# Patient Record
Sex: FEMALE | Employment: STUDENT | URBAN - METROPOLITAN AREA
[De-identification: names, ages, dates, MRNs, and addresses within clinical notes are randomized per-mention and may not be internally consistent; named-entity substitution may affect disease eponyms.]

---
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
## 2024-01-21 DIAGNOSIS — M53.3 SI (SACROILIAC) JOINT DYSFUNCTION: ICD-10-CM

## 2024-01-21 RX ORDER — DICLOFENAC SODIUM 75 MG/1
75 TABLET, DELAYED RELEASE ORAL 2 TIMES DAILY PRN
Qty: 40 TABLET | Refills: 1 | Status: SHIPPED | OUTPATIENT
Start: 2024-01-21

## 2024-01-25 ENCOUNTER — OFFICE VISIT (OUTPATIENT)
Dept: FAMILY MEDICINE | Facility: CLINIC | Age: 19
End: 2024-01-25
Payer: COMMERCIAL

## 2024-01-25 VITALS
HEIGHT: 66 IN | DIASTOLIC BLOOD PRESSURE: 45 MMHG | SYSTOLIC BLOOD PRESSURE: 106 MMHG | WEIGHT: 151 LBS | HEART RATE: 70 BPM | BODY MASS INDEX: 24.27 KG/M2

## 2024-01-25 DIAGNOSIS — M25.532 FOREARM JOINT PAIN, LEFT: Primary | ICD-10-CM

## 2024-01-25 ASSESSMENT — ENCOUNTER SYMPTOMS
PSYCHIATRIC NEGATIVE: 1
MYALGIAS: 1
NEUROLOGICAL NEGATIVE: 1
CONSTITUTIONAL NEGATIVE: 1

## 2024-01-25 NOTE — LETTER
1/25/2024      RE: Maida Thompson  [address]  [address]  [facility] [address]     Dear Colleague,    Thank you for referring your patient, Maida Thompson, to the [facility]. Please see a copy of my visit note below.    HPI  IN for f/u of L forearm pain.  Gets swelling and tightness after rowing.  Getting worse.  Some numbness and tingling at times.  Some blanching of the skin.  Has a very tight watch on her wrist which indents the skin.      Review of Systems   Constitutional: Negative.    HENT: Negative.    Musculoskeletal: Positive for joint pain and myalgias.   Neurological: Negative.    Psychiatric/Behavioral: Negative.          Physical Exam  Vitals reviewed.   Constitutional:       Appearance: Normal appearance.   Musculoskeletal:         General: No swelling, tenderness, deformity or signs of injury. Normal range of motion.   Skin:     General: Skin is warm and dry.      Capillary Refill: Capillary refill takes less than 2 seconds.      Findings: No bruising, erythema, lesion or rash.   Neurological:      General: No focal deficit present.      Mental Status: She is alert and oriented to person, place, and time.      Cranial Nerves: No cranial nerve deficit.      Sensory: No sensory deficit.      Motor: No weakness.      Coordination: Coordination normal.      Deep Tendon Reflexes: Reflexes normal.   Psychiatric:         Mood and Affect: Mood normal.         Thought Content: Thought content normal.         Judgment: Judgment normal.         L forearm pain  -concerns for tendinopathy vs compartment syndrome  -does wear a very tight watch on that wrist as well  -will get Xray/MRI and then likely test compartment pressures  -she will remove watch on that wrist to see if that helps symptoms as well.      Again, thank you for allowing me to participate in the care of your patient.      Sincerely,    Justin Voss MD

## 2024-01-25 NOTE — PROGRESS NOTES
HPI  IN for f/u of L forearm pain.  Gets swelling and tightness after rowing.  Getting worse.  Some numbness and tingling at times.  Some blanching of the skin.  Has a very tight watch on her wrist which indents the skin.      Review of Systems   Constitutional: Negative.    HENT: Negative.    Musculoskeletal: Positive for joint pain and myalgias.   Neurological: Negative.    Psychiatric/Behavioral: Negative.          Physical Exam  Vitals reviewed.   Constitutional:       Appearance: Normal appearance.   Musculoskeletal:         General: No swelling, tenderness, deformity or signs of injury. Normal range of motion.   Skin:     General: Skin is warm and dry.      Capillary Refill: Capillary refill takes less than 2 seconds.      Findings: No bruising, erythema, lesion or rash.   Neurological:      General: No focal deficit present.      Mental Status: She is alert and oriented to person, place, and time.      Cranial Nerves: No cranial nerve deficit.      Sensory: No sensory deficit.      Motor: No weakness.      Coordination: Coordination normal.      Deep Tendon Reflexes: Reflexes normal.   Psychiatric:         Mood and Affect: Mood normal.         Thought Content: Thought content normal.         Judgment: Judgment normal.         L forearm pain  -concerns for tendinopathy vs compartment syndrome  -does wear a very tight watch on that wrist as well  -will get Xray/MRI and then likely test compartment pressures  -she will remove watch on that wrist to see if that helps symptoms as well.

## 2024-01-26 ENCOUNTER — ANCILLARY PROCEDURE (OUTPATIENT)
Dept: GENERAL RADIOLOGY | Facility: CLINIC | Age: 19
End: 2024-01-26
Attending: FAMILY MEDICINE
Payer: COMMERCIAL

## 2024-01-26 ENCOUNTER — ANCILLARY PROCEDURE (OUTPATIENT)
Dept: MRI IMAGING | Facility: CLINIC | Age: 19
End: 2024-01-26
Attending: FAMILY MEDICINE
Payer: COMMERCIAL

## 2024-01-26 DIAGNOSIS — M25.532 FOREARM JOINT PAIN, LEFT: ICD-10-CM

## 2024-01-26 PROCEDURE — 73090 X-RAY EXAM OF FOREARM: CPT | Mod: LT | Performed by: RADIOLOGY

## 2024-01-26 PROCEDURE — 73218 MRI UPPER EXTREMITY W/O DYE: CPT | Mod: LT | Performed by: RADIOLOGY

## 2024-02-01 ENCOUNTER — OFFICE VISIT (OUTPATIENT)
Dept: FAMILY MEDICINE | Facility: CLINIC | Age: 19
End: 2024-02-01
Payer: COMMERCIAL

## 2024-02-01 VITALS
HEART RATE: 76 BPM | BODY MASS INDEX: 24.27 KG/M2 | HEIGHT: 66 IN | WEIGHT: 151 LBS | SYSTOLIC BLOOD PRESSURE: 126 MMHG | DIASTOLIC BLOOD PRESSURE: 71 MMHG

## 2024-02-01 DIAGNOSIS — M25.532 FOREARM JOINT PAIN, LEFT: Primary | ICD-10-CM

## 2024-02-01 ASSESSMENT — ENCOUNTER SYMPTOMS
MYALGIAS: 1
NEUROLOGICAL NEGATIVE: 1
PSYCHIATRIC NEGATIVE: 1
NECK PAIN: 0
BACK PAIN: 1
GASTROINTESTINAL NEGATIVE: 1
CONSTITUTIONAL NEGATIVE: 1
RESPIRATORY NEGATIVE: 1
FALLS: 0

## 2024-02-01 NOTE — PROGRESS NOTES
HPI  In for f/u of L arm pain and swelling.  Were going to do a comp pressure testing today but she hurt her back.  Arm is the same, but better with less activity.  Back was a problem last year.  On meds for that.  No other concerns.    Review of Systems   Constitutional: Negative.    HENT: Negative.    Respiratory: Negative.    Gastrointestinal: Negative.    Musculoskeletal: Positive for back pain and myalgias. Negative for falls, joint pain and neck pain.   Neurological: Negative.    Psychiatric/Behavioral: Negative.          Physical Exam  Vitals reviewed.   Musculoskeletal:         General: Tenderness present. Normal range of motion.   Skin:     General: Skin is warm and dry.   Neurological:      General: No focal deficit present.      Mental Status: She is alert and oriented to person, place, and time. Mental status is at baseline.      Cranial Nerves: No cranial nerve deficit.      Sensory: No sensory deficit.      Motor: No weakness.      Coordination: Coordination normal.      Gait: Gait normal.      Deep Tendon Reflexes: Reflexes normal.   Psychiatric:         Mood and Affect: Mood normal.         Behavior: Behavior normal.         Thought Content: Thought content normal.         Judgment: Judgment normal.           L arm pain/swelling  -back pain currently so will do compartment testing once she is able to exercise  -cont nsaids

## 2024-02-01 NOTE — LETTER
2/1/2024      RE: Maida Thompson  [address]  [address]  [facility] [address]     Dear Colleague,    Thank you for referring your patient, Maida Thompson, to the [facility]. Please see a copy of my visit note below.    HPI  In for f/u of L arm pain and swelling.  Were going to do a comp pressure testing today but she hurt her back.  Arm is the same, but better with less activity.  Back was a problem last year.  On meds for that.  No other concerns.    Review of Systems   Constitutional: Negative.    HENT: Negative.    Respiratory: Negative.    Gastrointestinal: Negative.    Musculoskeletal: Positive for back pain and myalgias. Negative for falls, joint pain and neck pain.   Neurological: Negative.    Psychiatric/Behavioral: Negative.          Physical Exam  Vitals reviewed.   Musculoskeletal:         General: Tenderness present. Normal range of motion.   Skin:     General: Skin is warm and dry.   Neurological:      General: No focal deficit present.      Mental Status: She is alert and oriented to person, place, and time. Mental status is at baseline.      Cranial Nerves: No cranial nerve deficit.      Sensory: No sensory deficit.      Motor: No weakness.      Coordination: Coordination normal.      Gait: Gait normal.      Deep Tendon Reflexes: Reflexes normal.   Psychiatric:         Mood and Affect: Mood normal.         Behavior: Behavior normal.         Thought Content: Thought content normal.         Judgment: Judgment normal.           L arm pain/swelling  -back pain currently so will do compartment testing once she is able to exercise  -cont nsaids       Again, thank you for allowing me to participate in the care of your patient.      Sincerely,    Justin Voss MD

## 2024-02-12 DIAGNOSIS — M79.89 PAIN AND SWELLING OF LEFT FOREARM: Primary | ICD-10-CM

## 2024-02-12 DIAGNOSIS — M79.632 PAIN AND SWELLING OF LEFT FOREARM: Primary | ICD-10-CM

## 2024-02-12 NOTE — PROGRESS NOTES
Ongoing left forearm pain and swelling especially with rowing. Was wearing a tight watch but has since removed the watch. Concern for exertional compartment pressure syndrome vs dvt. Will order US today, if negative will move forward with compartment pressure testing.     Discussed with attending Dr. Lonnie Voss MD

## 2024-02-16 ENCOUNTER — ANCILLARY PROCEDURE (OUTPATIENT)
Dept: ULTRASOUND IMAGING | Facility: CLINIC | Age: 19
End: 2024-02-16
Attending: FAMILY MEDICINE
Payer: COMMERCIAL

## 2024-02-16 DIAGNOSIS — M79.632 PAIN AND SWELLING OF LEFT FOREARM: ICD-10-CM

## 2024-02-16 DIAGNOSIS — M79.89 PAIN AND SWELLING OF LEFT FOREARM: ICD-10-CM

## 2024-02-16 PROCEDURE — 93971 EXTREMITY STUDY: CPT | Mod: LT | Performed by: RADIOLOGY

## 2024-03-12 ENCOUNTER — OFFICE VISIT (OUTPATIENT)
Dept: FAMILY MEDICINE | Facility: CLINIC | Age: 19
End: 2024-03-12
Payer: COMMERCIAL

## 2024-03-12 DIAGNOSIS — M25.532 FOREARM JOINT PAIN, LEFT: Primary | ICD-10-CM

## 2024-03-12 NOTE — LETTER
3/12/2024      RE: Maida Thompson  [address]  [address]  [facility] [address]     Dear Colleague,    Thank you for referring your patient, Maida Thompson, to the [facility]. Please see a copy of my visit note below.    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   She has had a full workup regarding left forearm pain and tightening during rowing which included MRI w/o contrast and US. Both were negative.   She explains that her left arm can go numbness when her elbow is fully extended for a short period of time. When she rows her hands become red and her forearms before white.   She notices a large bulge from the volar aspect of there left forearm when rowing. Symptoms resolve when she stopped rowing but can take hours.   Symptoms also occur when she is running and has her elbows flexed as 90 degrees.     COMPARTMENT PRESSURE TESTING.   The pt was verbally consented. Left upper extremity was sterilly prepped over deep volar and superficial volar comparments. Compartments were identified with ultrasound guidance and vasculature was noted in order to avoid it. Cpmpass unit was used and needle inserted into each of 2 compartments and resting pressures recorded. Pt was then instructed to row on erg until severe symptoms were reproduced and the procedure was repeated, again under sterile technique. Post-exertion pressures were recorded. Pt tolerated procedure well.     LEFT  PRE  POST (1min)  POST (5 mins)   Superficial Volar  10 8 4   Deep Volar 25  23 17     The patient exercised on a rowing machine for 18 minutes after a pre warm-up.   Developed pain in the LEFT Volar proximal and mid forearm approx 8 minutes into the 18 minutes.  26-32 avg stroke rate. She rated her pain as 7/10 at the 1 min post exercise pressure testing and 5/10 at the 5 min post exercise pressure testing. There was notable swelling and firmness in the volar  forearm.  Also noted was a discoloration (purplish) of the L distal wrist and hand.  Pulses were intact.       Compartment pressure testing was negative.  Presentation is suspicious for vascular component, will obtain MRA of the left forearm.     Anisa Villa MD  Primary Care Sports Medicine Fellow  [facility]       Attending Note:   I have  examined this patient and have reviewed the clinical presentation and progress note with the fellow. I agree with the treatment plan as outlined. The plan was formulated with the fellow on the day of the patient's visit. I have reviewed all imaging with the fellow and agree with the findings in the documentation. I have directly supervised the compartment pressure testing.     Kim Mukherjee MD, CAQ, CCD  [facility]  Sports Medicine and Bone Health      Again, thank you for allowing me to participate in the care of your patient.      Sincerely,    Kmi Mukherjee MD

## 2024-03-18 NOTE — PROGRESS NOTES
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   She has had a full workup regarding left forearm pain and tightening during rowing which included MRI w/o contrast and US. Both were negative.   She explains that her left arm can go numbness when her elbow is fully extended for a short period of time. When she rows her hands become red and her forearms before white.   She notices a large bulge from the volar aspect of there left forearm when rowing. Symptoms resolve when she stopped rowing but can take hours.   Symptoms also occur when she is running and has her elbows flexed as 90 degrees.     COMPARTMENT PRESSURE TESTING.   The pt was verbally consented. Left upper extremity was sterilly prepped over deep volar and superficial volar comparments. Compartments were identified with ultrasound guidance and vasculature was noted in order to avoid it. Cpmpass unit was used and needle inserted into each of 2 compartments and resting pressures recorded. Pt was then instructed to row on erg until severe symptoms were reproduced and the procedure was repeated, again under sterile technique. Post-exertion pressures were recorded. Pt tolerated procedure well.     LEFT  PRE  POST (1min)  POST (5 mins)   Superficial Volar  10 8 4   Deep Volar 25  23 17     The patient exercised on a rowing machine for 18 minutes after a pre warm-up.   Developed pain in the LEFT Volar proximal and mid forearm approx 8 minutes into the 18 minutes.  26-32 avg stroke rate. She rated her pain as 7/10 at the 1 min post exercise pressure testing and 5/10 at the 5 min post exercise pressure testing. There was notable swelling and firmness in the volar forearm.  Also noted was a discoloration (purplish) of the L distal wrist and hand.  Pulses were intact.       Compartment pressure testing was negative.  Presentation is suspicious for vascular component, will obtain MRA of the left forearm.     Anisa Villa,  MD  Primary Care Sports Medicine Fellow  [facility]

## 2024-03-25 NOTE — PROGRESS NOTES
Attending Note:   I have  examined this patient and have reviewed the clinical presentation and progress note with the fellow. I agree with the treatment plan as outlined. The plan was formulated with the fellow on the day of the patient's visit. I have reviewed all imaging with the fellow and agree with the findings in the documentation. I have directly supervised the compartment pressure testing.     Kim Mukherjee MD, CAQ, CCD  [facility]  Sports Medicine and Bone Health

## 2024-04-11 ENCOUNTER — OFFICE VISIT (OUTPATIENT)
Dept: FAMILY MEDICINE | Facility: CLINIC | Age: 19
End: 2024-04-11
Payer: COMMERCIAL

## 2024-04-11 VITALS
DIASTOLIC BLOOD PRESSURE: 71 MMHG | BODY MASS INDEX: 23.63 KG/M2 | SYSTOLIC BLOOD PRESSURE: 116 MMHG | HEART RATE: 74 BPM | WEIGHT: 147 LBS | HEIGHT: 66 IN

## 2024-04-11 DIAGNOSIS — M67.911 TENDINOPATHY OF RIGHT ROTATOR CUFF: Primary | ICD-10-CM

## 2024-04-11 DIAGNOSIS — M67.921 BICEPS TENDINOPATHY, RIGHT: ICD-10-CM

## 2024-04-11 RX ORDER — DICLOFENAC SODIUM 75 MG/1
75 TABLET, DELAYED RELEASE ORAL 2 TIMES DAILY
Qty: 42 TABLET | Refills: 1 | Status: SHIPPED | OUTPATIENT
Start: 2024-04-11 | End: 2024-05-28

## 2024-04-11 NOTE — LETTER
"  4/11/2024      RE: Maida Thompson  [address]  [address]  [facility] [address]     Dear Colleague,    Thank you for referring your patient, Maida Thompson, to the [facility]. Please see a copy of my visit note below.      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 Ellsworth's test positive, so difficult to say if there is any labral tearing, but no instability so we will start with rehab of the rotator cuff regardless. This seems to be a separate issue than her forearm paraesthesias (contralateral side is the worst side) and low likelihood of originating from the neck.   - 2-3 week course of diclofenac BID, then PRN  - rehab per ATC  - MRI if not improving at end of season or sooner if needed      Return if symptoms worsen or fail to improve.    Subjective  Maida is a 18 year old, presenting for the following health issues:  Shoulder Pain    HPI   [employer] rower. Pain in R shoulder ant, lateral for last 3 weeks. No trauma or obvious inciting event. Pain worse w/ full forward flexion and moving into extension while rowing. No prior injuries or issues w/ shoulder. Denies neck pain. Patient is also dealing w/ L forearm pain, numbness/tingling, swelling w/ rowing that is being worked up. May also be starting w/ some numbness in palm of R hand and fingers as well.     10-point review of systems per HPI, otherwise negative.         Objective   /71   Pulse 74   Ht 1.676 m (5' 6\")   Wt 66.7 kg (147 lb)   BMI 23.73 kg/m    Body mass index is 23.73 kg/m .  Physical Exam  Vitals reviewed.   Constitutional:       General: She is not in acute distress.     Appearance: Normal appearance. She is not ill-appearing.   HENT:      Head: Normocephalic and atraumatic.   Eyes:      " Conjunctiva/sclera: Conjunctivae normal.   Pulmonary:      Effort: Pulmonary effort is normal. No respiratory distress.   Skin:     General: Skin is warm and dry.   Neurological:      Mental Status: She is alert. Mental status is at baseline.      Gait: Gait normal.   Psychiatric:         Behavior: Behavior normal.         Thought Content: Thought content normal.          Musculoskeletal - R shoulder  - inspection: normal bone and joint alignment, no obvious deformity, no scapular winging, no AC step-off  - palpation: tight, tender trap, TTP RC insertion, bicepital groove, lateral deltoid, NTTP AC joint  - ROM: unlimited flexion and abduction, adduction, flexion, extension, ER. IR reduced to bottom of scapula compared to top of scapular on L.  - strength: 5/5  strength, 5/5 in all shoulder planes w/ pain  - special tests:  (+) Haresh's  (-) Neer  (-) Hawkin's  (+) Ward's  (+) Speed's        Bedside MSK US: mild fluid and very small partial tearing of proximal biceps tendon and rotator cuff tendon suggestive of tendinopathy.    Signed Electronically by: Chavo Upton MD      I was not present at this visit, but I have discussed the above visit with the fellow. I agree with the above assessment and plan.  Supervising physician: Justin Voss MD  [facility]      Again, thank you for allowing me to participate in the care of your patient.      Sincerely,    Chavo Upton MD

## 2024-04-11 NOTE — PROGRESS NOTES
"  Assessment & Plan     Tendinopathy of right rotator cuff  Biceps tendinopathy, right  17yo F [employer] w/ 3 weeks of insidious R shoulder pain w/ history, exam and bedside US suggestive of rotator cuff and biceps tendinopathy and trap spasm. [place]'s test positive, so difficult to say if there is any labral tearing, but no instability so we will start with rehab of the rotator cuff regardless. This seems to be a separate issue than her forearm paraesthesias (contralateral side is the worst side) and low likelihood of originating from the neck.   - 2-3 week course of diclofenac BID, then PRN  - rehab per ATC  - MRI if not improving at end of season or sooner if needed      Return if symptoms worsen or fail to improve.    Astrid Bey is a 18 year old, presenting for the following health issues:  Shoulder Pain    HPI   [employer]. Pain in R shoulder ant, lateral for last 3 weeks. No trauma or obvious inciting event. Pain worse w/ full forward flexion and moving into extension while rowing. No prior injuries or issues w/ shoulder. Denies neck pain. Patient is also dealing w/ L forearm pain, numbness/tingling, swelling w/ rowing that is being worked up. May also be starting w/ some numbness in palm of R hand and fingers as well.     10-point review of systems per HPI, otherwise negative.         Objective    /71   Pulse 74   Ht 1.676 m (5' 6\")   Wt 66.7 kg (147 lb)   BMI 23.73 kg/m    Body mass index is 23.73 kg/m .  Physical Exam  Vitals reviewed.   Constitutional:       General: She is not in acute distress.     Appearance: Normal appearance. She is not ill-appearing.   HENT:      Head: Normocephalic and atraumatic.   Eyes:      Conjunctiva/sclera: Conjunctivae normal.   Pulmonary:      Effort: Pulmonary effort is normal. No respiratory distress.   Skin:     General: Skin is warm and dry.   Neurological:      Mental Status: She is alert. Mental status is at baseline.      Gait: Gait normal. "   Psychiatric:         Behavior: Behavior normal.         Thought Content: Thought content normal.          Musculoskeletal - R shoulder  - inspection: normal bone and joint alignment, no obvious deformity, no scapular winging, no AC step-off  - palpation: tight, tender trap, TTP RC insertion, bicepital groove, lateral deltoid, NTTP AC joint  - ROM: unlimited flexion and abduction, adduction, flexion, extension, ER. IR reduced to bottom of scapula compared to top of scapular on L.  - strength: 5/5  strength, 5/5 in all shoulder planes w/ pain  - special tests:  (+) Haresh's  (-) Neer  (-) Hawkin's  (+) [place]'s  (+) Speed's        Bedside MSK US: mild fluid and very small partial tearing of proximal biceps tendon and rotator cuff tendon suggestive of tendinopathy.    Signed Electronically by: Chavo Upton MD

## 2024-04-25 NOTE — PROGRESS NOTES
I was not present at this visit, but I have discussed the above visit with the fellow. I agree with the above assessment and plan.  Supervising physician: Justin Voss MD  [facility]

## 2024-04-30 ENCOUNTER — ANCILLARY PROCEDURE (OUTPATIENT)
Dept: MRI IMAGING | Facility: CLINIC | Age: 19
End: 2024-04-30
Attending: FAMILY MEDICINE
Payer: COMMERCIAL

## 2024-04-30 DIAGNOSIS — M25.532 FOREARM JOINT PAIN, LEFT: ICD-10-CM

## 2024-04-30 PROCEDURE — A9585 GADOBUTROL INJECTION: HCPCS | Performed by: STUDENT IN AN ORGANIZED HEALTH CARE EDUCATION/TRAINING PROGRAM

## 2024-04-30 PROCEDURE — 73225 MR ANGIO UPR EXTR W/O&W/DYE: CPT | Mod: LT | Performed by: STUDENT IN AN ORGANIZED HEALTH CARE EDUCATION/TRAINING PROGRAM

## 2024-04-30 RX ORDER — GADOBUTROL 604.72 MG/ML
10 INJECTION INTRAVENOUS ONCE
Status: COMPLETED | OUTPATIENT
Start: 2024-04-30 | End: 2024-04-30

## 2024-04-30 RX ADMIN — GADOBUTROL 10 ML: 604.72 INJECTION INTRAVENOUS at 16:16

## 2024-04-30 NOTE — DISCHARGE INSTRUCTIONS
MRI Contrast Discharge Instructions    The IV contrast you received today will pass out of your body in your  urine. This will happen in the next 24 hours. You will not feel this process.  Your urine will not change color.    Drink at least 4 extra glasses of water or juice today (unless your doctor  has restricted your fluids). This reduces the stress on your kidneys.  You may take your regular medicines.    If you are on dialysis: It is best to have dialysis today.    If you have a reaction: Most reactions happen right away. If you have  any new symptoms after leaving the hospital (such as hives or swelling),  call your hospital at the correct number below. Or call your family doctor.  If you have breathing distress or wheezing, call 911.    Special instructions: ***    I have read and understand the above information.    Signature:______________________________________ Date:___________    Staff:__________________________________________ Date:___________     Time:__________    [facility] Departments:    ___[place]: [phone]  ___[facility]: [phone]  ___[place]: [phone] ___[facility]: [phone]  ___[facility]: [phone]  ___[facility]: [phone]  ___[phone]  ___[facility]: [phone]  ___[phone]

## 2024-05-28 ENCOUNTER — OFFICE VISIT (OUTPATIENT)
Dept: FAMILY MEDICINE | Facility: CLINIC | Age: 19
End: 2024-05-28
Payer: COMMERCIAL

## 2024-05-28 VITALS
WEIGHT: 147 LBS | DIASTOLIC BLOOD PRESSURE: 68 MMHG | HEART RATE: 67 BPM | SYSTOLIC BLOOD PRESSURE: 107 MMHG | HEIGHT: 66 IN | BODY MASS INDEX: 23.63 KG/M2

## 2024-05-28 DIAGNOSIS — M67.911 TENDINOPATHY OF RIGHT ROTATOR CUFF: ICD-10-CM

## 2024-05-28 RX ORDER — DICLOFENAC SODIUM 75 MG/1
75 TABLET, DELAYED RELEASE ORAL 2 TIMES DAILY
Qty: 42 TABLET | Refills: 1 | Status: SHIPPED | OUTPATIENT
Start: 2024-05-28 | End: 2024-06-03

## 2024-05-28 NOTE — PROGRESS NOTES
"[facility]            SUBJECTIVE:     Maida Thompson is a 18 year old female presenting to clinic today for follow-up right shoulder.    Last seen on 4/11/2024:  '17yo F [employer] w/ 3 weeks of insidious R shoulder pain w/ history, exam and bedside US suggestive of rotator cuff and biceps tendinopathy and trap spasm. [place]'s test positive, so difficult to say if there is any labral tearing, but no instability so we will start with rehab of the rotator cuff regardless. This seems to be a separate issue than her forearm paraesthesias (contralateral side is the worst side) and low likelihood of originating from the neck.   - 2-3 week course of diclofenac BID, then PRN  - rehab per ATC  - MRI if not improving at end of season or sooner if needed  Return if symptoms worsen or fail to improve.\"    Today patient reports that she completed the diclofenac but unsure if this improved her symptoms.   Symptoms did not worsen with diclofenac.  Was able to row through the end of the season.     PMH, Medications and Allergies were reviewed and updated as needed.    ROS:  As noted above otherwise negative.    There is no problem list on file for this patient.      Current Outpatient Medications   Medication Sig Dispense Refill    diclofenac (VOLTAREN) 75 MG EC tablet Take 1 tablet (75 mg) by mouth 2 times daily 42 tablet 1    albuterol (PROAIR HFA/PROVENTIL HFA/VENTOLIN HFA) 108 (90 Base) MCG/ACT inhaler Inhale 2 puffs into the lungs every 6 hours as needed for shortness of breath / dyspnea or wheezing 18 g 0    diclofenac (VOLTAREN) 75 MG EC tablet Take 1 tablet (75 mg) by mouth 2 times daily as needed for moderate pain 40 tablet 1              OBJECTIVE:     Vitals:   Vitals:    05/28/24 1213   BP: 107/68   Pulse: 67   Weight: 66.7 kg (147 lb)   Height: 1.676 m (5' 6\")     BMI: Body mass index is 23.73 kg/m .    Gen:  Well nourished and in no acute distress  HEENT: " Extraocular movement intact.  Neck: supple  Skin: No rash, erythema, ecchymosis or obvious abnormality  Psych: Euthymic   Neuro: Alert and oriented.    Musculoskeletal - R shoulder  - inspection: normal bone and joint alignment, no obvious deformity, no scapular winging, no AC step-off  - palpation: TTP RC insertion, bicepital groove, NTTP AC joint  - ROM: unlimited flexion and abduction, adduction, flexion, extension, ER, IR w/ pain.   - strength: 5/5  strength, 5/5 in all shoulder planes w/ pain  - special tests:  (+) Haresh's  (-) Neer  (-) Hawkin's  (+) [place]'s  (+) Speed's            ASSESSMENT & PLAN:      Maida was seen today for shoulder right.    Diagnoses and all orders for this visit:    Tendinopathy of right rotator cuff  -     diclofenac (VOLTAREN) 75 MG EC tablet; Take 1 tablet (75 mg) by mouth 2 times daily  -     MR Shoulder Right w/o Contrast; Future      Maida continues to have right shoulder pain.   No changes from previous visit but was able to row through the end of the season.   Will refill diclofenac and recommend that she take time off for an additional week.  Will obtain MRI right shoulder w/o contrast for further assessment.   Patient to return home on 6/3/2024. She will start working with a physical therapist once she is home.   Will follow-up in Fall 2024 when she is back at school.    Options for treatment and/or follow-up care were reviewed with the patient was actively involved in the decision making process. Patient verbalized understanding and was in agreement with the plan.    The patient was seen by and discussed with attending physician Dr.Suzanne YANNICK Mukherjee MD, CAQ, CCD, who agrees with the plan unless otherwise stated.    Athlete's  was present for the entire encounter.      Anisa Villa  Primary Care Sports Medicine Fellow  [facility]

## 2024-05-28 NOTE — LETTER
"  5/28/2024      RE: Maida Thompson  [address]  [address]  [place] [address]     Dear Colleague,    Thank you for referring your patient, Maida Thompson, to the [facility]. Please see a copy of my visit note below.    [facility]            SUBJECTIVE:     Maida Thompson is a 18 year old female presenting to clinic today for follow-up right shoulder.    Last seen on 4/11/2024:  '17yo F [employer] w/ 3 weeks of insidious R shoulder pain w/ history, exam and bedside US suggestive of rotator cuff and biceps tendinopathy and trap spasm. [place]'s test positive, so difficult to say if there is any labral tearing, but no instability so we will start with rehab of the rotator cuff regardless. This seems to be a separate issue than her forearm paraesthesias (contralateral side is the worst side) and low likelihood of originating from the neck.   - 2-3 week course of diclofenac BID, then PRN  - rehab per ATC  - MRI if not improving at end of season or sooner if needed  Return if symptoms worsen or fail to improve.\"    Today patient reports that she completed the diclofenac but unsure if this improved her symptoms.   Symptoms did not worsen with diclofenac.  Was able to row through the end of the season.     PMH, Medications and Allergies were reviewed and updated as needed.    ROS:  As noted above otherwise negative.    There is no problem list on file for this patient.      Current Outpatient Medications   Medication Sig Dispense Refill     diclofenac (VOLTAREN) 75 MG EC tablet Take 1 tablet (75 mg) by mouth 2 times daily 42 tablet 1     albuterol (PROAIR HFA/PROVENTIL HFA/VENTOLIN HFA) 108 (90 Base) MCG/ACT inhaler Inhale 2 puffs into the lungs every 6 hours as needed for shortness of breath / dyspnea or wheezing 18 g 0     diclofenac (VOLTAREN) 75 MG EC tablet Take 1 tablet (75 mg) by mouth 2 times daily as needed for " "moderate pain 40 tablet 1              OBJECTIVE:     Vitals:   Vitals:    05/28/24 1213   BP: 107/68   Pulse: 67   Weight: 66.7 kg (147 lb)   Height: 1.676 m (5' 6\")     BMI: Body mass index is 23.73 kg/m .    Gen:  Well nourished and in no acute distress  HEENT: Extraocular movement intact.  Neck: supple  Skin: No rash, erythema, ecchymosis or obvious abnormality  Psych: Euthymic   Neuro: Alert and oriented.    Musculoskeletal - R shoulder  - inspection: normal bone and joint alignment, no obvious deformity, no scapular winging, no AC step-off  - palpation: TTP RC insertion, bicepital groove, NTTP AC joint  - ROM: unlimited flexion and abduction, adduction, flexion, extension, ER, IR w/ pain.   - strength: 5/5  strength, 5/5 in all shoulder planes w/ pain  - special tests:  (+) Haresh's  (-) Neer  (-) Hawkin's  (+) [place]'s  (+) Speed's            ASSESSMENT & PLAN:      Maida was seen today for shoulder right.    Diagnoses and all orders for this visit:    Tendinopathy of right rotator cuff  -     diclofenac (VOLTAREN) 75 MG EC tablet; Take 1 tablet (75 mg) by mouth 2 times daily  -     MR Shoulder Right w/o Contrast; Future      Maida continues to have right shoulder pain.   No changes from previous visit but was able to row through the end of the season.   Will refill diclofenac and recommend that she take time off for an additional week.  Will obtain MRI right shoulder w/o contrast for further assessment.   Patient to return home on 6/3/2024. She will start working with a physical therapist once she is home.   Will follow-up in Fall 2024 when she is back at school.    Options for treatment and/or follow-up care were reviewed with the patient was actively involved in the decision making process. Patient verbalized understanding and was in agreement with the plan.    The patient was seen by and discussed with attending physician Dr.Suzanne YANNICK Mukherjee MD, CAQ, CCD, who agrees with the plan unless otherwise " stated.    Athlete's  was present for the entire encounter.      Anisa Villa  Primary Care Sports Medicine Fellow  [facility]      Again, thank you for allowing me to participate in the care of your patient.      Sincerely,    Anisa Villa MD

## 2024-06-03 ENCOUNTER — ANCILLARY PROCEDURE (OUTPATIENT)
Dept: MRI IMAGING | Facility: CLINIC | Age: 19
End: 2024-06-03
Attending: FAMILY MEDICINE
Payer: COMMERCIAL

## 2024-06-03 DIAGNOSIS — M67.911 TENDINOPATHY OF RIGHT ROTATOR CUFF: ICD-10-CM

## 2024-06-03 PROCEDURE — 73221 MRI JOINT UPR EXTREM W/O DYE: CPT | Mod: RT | Performed by: RADIOLOGY

## 2024-06-03 RX ORDER — DICLOFENAC SODIUM 75 MG/1
75 TABLET, DELAYED RELEASE ORAL 2 TIMES DAILY
Qty: 42 TABLET | Refills: 1 | Status: SHIPPED | OUTPATIENT
Start: 2024-06-03

## 2024-10-10 ENCOUNTER — OFFICE VISIT (OUTPATIENT)
Dept: FAMILY MEDICINE | Facility: CLINIC | Age: 19
End: 2024-10-10
Payer: COMMERCIAL

## 2024-10-10 VITALS
WEIGHT: 145 LBS | BODY MASS INDEX: 23.3 KG/M2 | HEART RATE: 76 BPM | DIASTOLIC BLOOD PRESSURE: 74 MMHG | HEIGHT: 66 IN | SYSTOLIC BLOOD PRESSURE: 107 MMHG

## 2024-10-10 DIAGNOSIS — M25.532 FOREARM JOINT PAIN, LEFT: Primary | ICD-10-CM

## 2024-10-10 NOTE — PROGRESS NOTES
HPI  In for follow-up of L forearm pain.  Has returned as far as pain.  She has had a multitude of studies with none showing a clear cut etiology.  We hav thought about nerve impingment and compartment issues.  She had normal eval of comp pressures but still could be an issue.  Sxs were worse when wearing a restrictive watch as well.  We discussed and answered all questions.    Review of Systems   Constitutional: Negative.    HENT: Negative.     Respiratory: Negative.     Cardiovascular: Negative.    Musculoskeletal:  Positive for myalgias. Negative for back pain, falls, joint pain and neck pain.   Neurological:  Positive for tingling and sensory change. Negative for dizziness, tremors, speech change, focal weakness and headaches.   Psychiatric/Behavioral: Negative.           Physical Exam  Vitals reviewed.   Constitutional:       Appearance: Normal appearance.   Musculoskeletal:         General: Tenderness present. No swelling. Normal range of motion.   Neurological:      General: No focal deficit present.      Mental Status: She is alert and oriented to person, place, and time.   Psychiatric:         Mood and Affect: Mood normal.         Behavior: Behavior normal.         Thought Content: Thought content normal.           L forearm pain  -normal MRI, US and MRA as well as comp pressure testing  -improved with needling  -will have her go and have an Ortho visit/eval and input on further testing and management

## 2024-10-25 NOTE — TELEPHONE ENCOUNTER
DIAGNOSIS: [place] [employer], giana, saw Dr Voss for chronic injury to left forearm.    APPOINTMENT DATE: 10/30/24   NOTES STATUS DETAILS   OFFICE NOTE from referring provider Internal 10/10/24 - Justin Voss MD - Family Med   MEDICATION LIST Internal

## 2024-10-29 ENCOUNTER — TELEPHONE (OUTPATIENT)
Dept: ORTHOPEDICS | Facility: CLINIC | Age: 19
End: 2024-10-29
Payer: COMMERCIAL

## 2024-10-29 NOTE — TELEPHONE ENCOUNTER
Left Voicemail (1st Attempt) and Sent Mychart (1st Attempt) for the patient to call back and schedule the following:    Appointment type: NEW HAND/WRIST  Provider: Dr. Toussaint  Return date: 10/30 appt time r/s to 10:40am same day

## 2024-10-29 NOTE — TELEPHONE ENCOUNTER
Patient confirmed scheduled appointment:  Date: 10/30/24  Time: 14:40am  Visit type: NEW HAND/WRIST  Provider:   Location: [facility]

## 2024-10-30 ENCOUNTER — OFFICE VISIT (OUTPATIENT)
Dept: ORTHOPEDICS | Facility: CLINIC | Age: 19
End: 2024-10-30
Payer: COMMERCIAL

## 2024-10-30 ENCOUNTER — PRE VISIT (OUTPATIENT)
Dept: ORTHOPEDICS | Facility: CLINIC | Age: 19
End: 2024-10-30

## 2024-10-30 DIAGNOSIS — M79.632 LEFT FOREARM PAIN: Primary | ICD-10-CM

## 2024-10-30 PROCEDURE — 99203 OFFICE O/P NEW LOW 30 MIN: CPT | Performed by: ORTHOPAEDIC SURGERY

## 2024-10-30 NOTE — LETTER
10/30/2024      Maida Thompson  [address]  [address]  [place] [address]      Dear Colleague,    Thank you for referring your patient, Maida Thompson, to the [facility] [place]. Please see a copy of my visit note below.    Orthopaedic Surgery Consultation  10/30/2024 11:13 AM   by Dwaine Toussaint MD    Maida Thompson MRN# [id number]   Age: 19 year old YOB: 2005     Reason for consult: Left volar forearm pain                       Assessment and Plan:   Assessment:   Maida is a right-hand-dominant 19-year-old female who is otherwise healthy presenting with pain in the left volar forearm that has been present for over a year with activities.  Patient has had significant workup including ultrasounds, x-rays, MRI and MRA of the forearm and compartment pressure measurements without a determined etiology.      Plan:   Had a long discussion with the patient as well as her  regarding her current symptoms.  We had a discussion regarding her previous studies that she has had performed.  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  She also has pain and discomfort over her lacertus fibrosis and wondering if with activity she would possibly have compression of her median nerve at her lacertus causing this painful cramping with activities as well as with elbow extension.  We recommended obtaining a musculoskeletal ultrasound of the left upper extremity to further evaluate the median, ulnar and radial nerves in the forearm free of any evidence of compression as well as for evaluation of the musculature.  We discussed that exertional compartment syndrome could potentially still be present despite the normal compartment measures.  We discussed that pending the ultrasound results we could also further discuss a volar fasciotomy to see if this would improve her symptoms.  All of  the patient's questions were answered and addressed along with her athletic trainers.  We will plan to have that ultrasound performed at [facility] with Dr. Hayes.  We will plan to have the patient follow-up after this is performed to discuss the results and further management.              History of Present Illness:   19 year old female who is right-hand dominant and otherwise healthy is presenting with 1 year of volar forearm pain.  She is a collegiate rower and with activities such as high intensity rolling or with rolling at a lower intensity for longer periods of time such as 40 minutes she will develop a significant pain in the volar aspect of the forearm with cramping and spasms into the right hand with associated sensory changes.  She will also develop cramping and discomfort in the forearm with prolonged periods with her elbow in an extended position.  Symptoms will improve with cessation of her activities.  Depending on how intense the activity was this will impact the time it takes for her symptoms to completely resolve.  She has tried reducing her activity without significant improvement.  They have tried some bracing as well as dry needling and other modalities that we will sometimes help improving her symptoms but they have not found any modalities that will prevent her symptoms from occurring.  She has rode for 8 years and has not had symptoms until this last year.  There is no trauma or injury at the onset of her symptoms.    She has been seen in the sports medicine clinic and has had an extensive workup including venous ultrasounds of the upper extremity, MRIs of the forearm without significant results, MRA of the forearm, as well as compartment pressure monitoring of the volar forearm that have been equivocal.           Past Medical History:   There is no problem list on file for this patient.    No past medical history on file.         Past Surgical History:   Relevant surgical history  as mentioned in HPI.  No past surgical history on file.         Social History:     Social History     Socioeconomic History     Marital status: Single     Spouse name: Not on file     Number of children: Not on file     Years of education: Not on file     Highest education level: Not on file   Occupational History     Not on file   Tobacco Use     Smoking status: Never     Smokeless tobacco: Never   Substance and Sexual Activity     Alcohol use: Yes     Drug use: Never     Sexual activity: Not on file   Other Topics Concern     Not on file   Social History Narrative     Not on file     Social Drivers of Health     Financial Resource Strain: Not on file   Food Insecurity: Not on file   Transportation Needs: Not on file   Physical Activity: Not on file   Stress: Not on file   Social Connections: Not on file   Interpersonal Safety: Low Risk  (10/10/2024)    Interpersonal Safety      Do you feel physically and emotionally safe where you currently live?: Yes      Within the past 12 months, have you been hit, slapped, kicked or otherwise physically hurt by someone?: No      Within the past 12 months, have you been humiliated or emotionally abused in other ways by your partner or ex-partner?: No   Housing Stability: Not on file     Smoking, EtOH,        Family History:   No family history on file.  No history of problems with bleeding or anesthesia       Allergies:   No Known Allergies       Medications:     Current Outpatient Medications   Medication Sig Dispense Refill     albuterol (PROAIR HFA/PROVENTIL HFA/VENTOLIN HFA) 108 (90 Base) MCG/ACT inhaler Inhale 2 puffs into the lungs every 6 hours as needed for shortness of breath / dyspnea or wheezing (Patient not taking: Reported on 10/30/2024) 18 g 0     diclofenac (VOLTAREN) 75 MG EC tablet Take 1 tablet (75 mg) by mouth 2 times daily 42 tablet 1     diclofenac (VOLTAREN) 75 MG EC tablet Take 1 tablet (75 mg) by mouth 2 times daily as needed for moderate pain 40 tablet  1     No current facility-administered medications for this visit.             Review of Systems:   A comprehensive 10 point review of systems (constitutional, ENT, cardiac, peripheral vascular, lymphatic, respiratory, GI, , Musculoskeletal, skin, Neurological) was performed and found to be negative except as described in this note.           Physical Exam:     EXAMINATION pertinent findings:   VITAL SIGNS: Last menstrual period 06/04/2024.  There is no height or weight on file to calculate BMI.  RESP: non labored breathing   CARD: comfortable, no acute distress  ABD: benign   On examination of the bilateral upper extremities there is no obvious skin lesions or changes over the bilateral upper extremities.  She has symmetric range of motion of the bilateral elbows with 5 degrees of hyperextension to 45 degrees of flexion.  She has full and symmetric pronosupination.  Sensory exam with symmetric sensation to light touch in the median, ulnar and radial nerve distribution.  Palpable radial pulse that is symmetric bilaterally.   strength is equal bilaterally, 5 out of 5 strength with APB as well as her interossei.  She notes discomfort in the volar forearm with resisted FDS and FDP to each finger.  No significant discomfort with resisted FPL.  She does have discomfort over the lateral elbow with resisted wrist extension and long finger extension as well as pain over the medial elbow with resisted wrist flexion.  Negative Tinel's at the cubital tunnel as well as the wrist.  There is diffuse tenderness over the volar compartments with less over the mobile wad.  There is tenderness to palpation over the lacertus fibrosis.  Mild discomfort in the area of the radial tunnel with a negative Tinel's in this area.            Data:     All laboratory data reviewed  All imaging studies reviewed by me.  Pertinent Imaging and Lab Review:     MR of the left forearm   Impression:     Trace fluid within the tendon sheaths of the  second and fourth  extensor compartments suggesting mild tenosynovitis.     US left upper extremity duplex  IMPRESSION:  1. No deep or superficial venous thrombosis demonstrated in the left  arm.     2. No sonographic abnormality demonstrated in the area of reported  pain in the left forearm.    MRA left forearm  Impression: Widely patent arterial structures, with no evidence of  brachial artery narrowing.    Compartment pressure measurement 2024   Compartment pressure testing was negative.  Presentation is suspicious for vascular component, will obtain MRA of the left forearm.       Total Time = 20 min, 50% of which was spent in counseling and coordination of care as documented above.    Dwaine Toussaint MD  Orthopedic Surgery, Upper Extremity  Cell [phone]         Again, thank you for allowing me to participate in the care of your patient.        Sincerely,        Dwaine Toussaint MD

## 2024-10-30 NOTE — PROGRESS NOTES
Orthopaedic Surgery Consultation  10/30/2024 11:13 AM   by Dwaine Toussaint MD    Maida Thompson MRN# [id number]   Age: 19 year old YOB: 2005     Reason for consult: Left volar forearm pain                       Assessment and Plan:   Assessment:   Maida is a right-hand-dominant 19-year-old female who is otherwise healthy presenting with pain in the left volar forearm that has been present for over a year with activities.  Patient has had significant workup including ultrasounds, x-rays, MRI and MRA of the forearm and compartment pressure measurements without a determined etiology.      Plan:   Had a long discussion with the patient as well as her  regarding her current symptoms.  We had a discussion regarding her previous studies that she has had performed.  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  She also has pain and discomfort over her lacertus fibrosis and wondering if with activity she would possibly have compression of her median nerve at her lacertus causing this painful cramping with activities as well as with elbow extension.  We recommended obtaining a musculoskeletal ultrasound of the left upper extremity to further evaluate the median, ulnar and radial nerves in the forearm free of any evidence of compression as well as for evaluation of the musculature.  We discussed that exertional compartment syndrome could potentially still be present despite the normal compartment measures.  We discussed that pending the ultrasound results we could also further discuss a volar fasciotomy to see if this would improve her symptoms.  All of the patient's questions were answered and addressed along with her athletic trainers.  We will plan to have that ultrasound performed at [facility] with Dr. Hayes.  We will plan to have the patient follow-up after this is performed to discuss the  results and further management.              History of Present Illness:   19 year old female who is right-hand dominant and otherwise healthy is presenting with 1 year of volar forearm pain.  She is a collegiate rower and with activities such as high intensity rolling or with rolling at a lower intensity for longer periods of time such as 40 minutes she will develop a significant pain in the volar aspect of the forearm with cramping and spasms into the right hand with associated sensory changes.  She will also develop cramping and discomfort in the forearm with prolonged periods with her elbow in an extended position.  Symptoms will improve with cessation of her activities.  Depending on how intense the activity was this will impact the time it takes for her symptoms to completely resolve.  She has tried reducing her activity without significant improvement.  They have tried some bracing as well as dry needling and other modalities that we will sometimes help improving her symptoms but they have not found any modalities that will prevent her symptoms from occurring.  She has rode for 8 years and has not had symptoms until this last year.  There is no trauma or injury at the onset of her symptoms.    She has been seen in the sports medicine clinic and has had an extensive workup including venous ultrasounds of the upper extremity, MRIs of the forearm without significant results, MRA of the forearm, as well as compartment pressure monitoring of the volar forearm that have been equivocal.           Past Medical History:   There is no problem list on file for this patient.    No past medical history on file.         Past Surgical History:   Relevant surgical history as mentioned in HPI.  No past surgical history on file.         Social History:     Social History     Socioeconomic History    Marital status: Single     Spouse name: Not on file    Number of children: Not on file    Years of education: Not on file     Highest education level: Not on file   Occupational History    Not on file   Tobacco Use    Smoking status: Never    Smokeless tobacco: Never   Substance and Sexual Activity    Alcohol use: Yes    Drug use: Never    Sexual activity: Not on file   Other Topics Concern    Not on file   Social History Narrative    Not on file     Social Drivers of Health     Financial Resource Strain: Not on file   Food Insecurity: Not on file   Transportation Needs: Not on file   Physical Activity: Not on file   Stress: Not on file   Social Connections: Not on file   Interpersonal Safety: Low Risk  (10/10/2024)    Interpersonal Safety     Do you feel physically and emotionally safe where you currently live?: Yes     Within the past 12 months, have you been hit, slapped, kicked or otherwise physically hurt by someone?: No     Within the past 12 months, have you been humiliated or emotionally abused in other ways by your partner or ex-partner?: No   Housing Stability: Not on file     Smoking, EtOH,        Family History:   No family history on file.  No history of problems with bleeding or anesthesia       Allergies:   No Known Allergies       Medications:     Current Outpatient Medications   Medication Sig Dispense Refill    albuterol (PROAIR HFA/PROVENTIL HFA/VENTOLIN HFA) 108 (90 Base) MCG/ACT inhaler Inhale 2 puffs into the lungs every 6 hours as needed for shortness of breath / dyspnea or wheezing (Patient not taking: Reported on 10/30/2024) 18 g 0    diclofenac (VOLTAREN) 75 MG EC tablet Take 1 tablet (75 mg) by mouth 2 times daily 42 tablet 1    diclofenac (VOLTAREN) 75 MG EC tablet Take 1 tablet (75 mg) by mouth 2 times daily as needed for moderate pain 40 tablet 1     No current facility-administered medications for this visit.             Review of Systems:   A comprehensive 10 point review of systems (constitutional, ENT, cardiac, peripheral vascular, lymphatic, respiratory, GI, , Musculoskeletal, skin, Neurological) was  performed and found to be negative except as described in this note.           Physical Exam:     EXAMINATION pertinent findings:   VITAL SIGNS: Last menstrual period 06/04/2024.  There is no height or weight on file to calculate BMI.  RESP: non labored breathing   CARD: comfortable, no acute distress  ABD: benign   On examination of the bilateral upper extremities there is no obvious skin lesions or changes over the bilateral upper extremities.  She has symmetric range of motion of the bilateral elbows with 5 degrees of hyperextension to 45 degrees of flexion.  She has full and symmetric pronosupination.  Sensory exam with symmetric sensation to light touch in the median, ulnar and radial nerve distribution.  Palpable radial pulse that is symmetric bilaterally.   strength is equal bilaterally, 5 out of 5 strength with APB as well as her interossei.  She notes discomfort in the volar forearm with resisted FDS and FDP to each finger.  No significant discomfort with resisted FPL.  She does have discomfort over the lateral elbow with resisted wrist extension and long finger extension as well as pain over the medial elbow with resisted wrist flexion.  Negative Tinel's at the cubital tunnel as well as the wrist.  There is diffuse tenderness over the volar compartments with less over the mobile wad.  There is tenderness to palpation over the lacertus fibrosis.  Mild discomfort in the area of the radial tunnel with a negative Tinel's in this area.            Data:     All laboratory data reviewed  All imaging studies reviewed by me.  Pertinent Imaging and Lab Review:     MR of the left forearm   Impression:     Trace fluid within the tendon sheaths of the second and fourth  extensor compartments suggesting mild tenosynovitis.     US left upper extremity duplex  IMPRESSION:  1. No deep or superficial venous thrombosis demonstrated in the left  arm.     2. No sonographic abnormality demonstrated in the area of  reported  pain in the left forearm.    MRA left forearm  Impression: Widely patent arterial structures, with no evidence of  brachial artery narrowing.    Compartment pressure measurement 2024   Compartment pressure testing was negative.  Presentation is suspicious for vascular component, will obtain MRA of the left forearm.       Total Time = 20 min, 50% of which was spent in counseling and coordination of care as documented above.    Dwaine Toussaint MD  Orthopedic Surgery, Upper Extremity  Cell [phone]

## 2024-10-31 ASSESSMENT — ENCOUNTER SYMPTOMS
DIZZINESS: 0
FALLS: 0
SENSORY CHANGE: 1
CARDIOVASCULAR NEGATIVE: 1
RESPIRATORY NEGATIVE: 1
BACK PAIN: 0
PSYCHIATRIC NEGATIVE: 1
NECK PAIN: 0
FOCAL WEAKNESS: 0
TREMORS: 0
CONSTITUTIONAL NEGATIVE: 1
HEADACHES: 0
MYALGIAS: 1
TINGLING: 1
SPEECH CHANGE: 0

## 2024-12-04 ENCOUNTER — OFFICE VISIT (OUTPATIENT)
Dept: ORTHOPEDICS | Facility: CLINIC | Age: 19
End: 2024-12-04
Payer: COMMERCIAL

## 2024-12-04 DIAGNOSIS — M79.A19: Primary | ICD-10-CM

## 2024-12-04 PROCEDURE — 99214 OFFICE O/P EST MOD 30 MIN: CPT | Mod: GC | Performed by: ORTHOPAEDIC SURGERY

## 2024-12-04 NOTE — NURSING NOTE
Teaching Flowsheet   Relevant Diagnosis: Left forearm pain  Teaching Topic: Left forearm compartment release    CSC under MAC/ Block with Dr Dwaine Toussaint.  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 No    Patient demonstrates understanding of the following:  Reason for the appointment, diagnosis and treatment plan: Yes  Knowledge of proper use of medications and conditions for which they are ordered (with special attention to potential side effects or drug interactions): Yes  Which situations necessitate calling provider and whom to contact: Yes    Teaching Concerns Addressed:   Proper use and care of  (medical equip, care aids, etc.): Yes  Nutritional needs and diet plan: Yes  Pain management techniques: Yes  Wound Care: Yes  How and/when to access community resources: Yes     Instructional Materials Used/Given: Preoperative surgery packet, antibacterial Chlorhexidine soap. Stop Light Tool reviewed, after-hours number provided, patient verbalized understanding, had no immediate questions. Helena Shannon RN

## 2024-12-04 NOTE — LETTER
12/4/2024      Madia Thompson  [address]  [facility] [address]      Dear Colleague,    Thank you for referring your patient, Maida Thompson, to the [facility] [place]. Please see a copy of my visit note below.    Maida Thompson is a 19-year-old right-hand-dominant female who returns to clinic today to rediscuss the symptoms of her left volar forearm pain.    Today she reports minimal changes in terms of symptoms as compared to her last time being evaluated in clinic.  She has been experiencing predominantly volar forearm tightness, pain and some numbness and tingling in an ulnar nerve distribution in the ring and small fingers that is brought on with exertion.  This is usually with heavier activities such as rolling on her.  If doing a very light ergo workout it may only come on after a long time but if it is more an intense workout then it comes on within minutes.  When it comes on it limits her ability to flex her fingers and she feels weak.  Does not experience any similar symptoms on the dorsum of her forearm.  She reports minimal symptoms while at rest but if doing some activities like lifting heavier chairs then it may come on slightly.  She was seen at [facility] and had an ultrasound for nerve imaging.  She is here to discuss next steps in management.  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  She is sufficiently bothered by the symptoms to consider other, more aggressive options.    We reviewed the ultrasound that Dr. Hayes performed and I discussed the findings with him as well.  This was very helpful.  The median nerve at the elbow showed no significant areas of compression.    Physical examination: Focused examination of the left forearm reveals atraumatic limb with no gross deformity.  Nontender to palpation along the flexor pronator mass however she does localize the symptoms to this area and less so over the medial elbow or cubital  tunnel.  Negative Tinel's at the cubital tunnel with no ulnar nerve instability noted with elbow flexion.  No tenderness to palpation over the lateral epicondyle, and no pain to palpation over the dorsal forearm compartment.  Minimal pain over the median nerve at the antecubital fossa.  Sensation is intact to light touch in median, radial, ulnar nerve distributions.  Good strength against resistance in FDP to the ring and small fingers and first dorsal interossei without any clear first dorsal interossei wasting.  Tinel negative at the elbow.  Minimal discomfort with resisted wrist flexion.  No discomfort with resisted wrist extension.    Assessment: 19-year-old female with exertional volar forearm compartment syndrome    Plan: Her symptoms are certainly more consistent with exertional compartment syndrome than they are any nerve entrapment.  We reviewed the above assessment with her this morning and discussed options for next steps in management.  She is sufficiently bothered by the symptoms and is interested in proceeding with surgical treatment, but of course would like to discuss with her family and .  We would plan for left forearm fasciotomy, volar superficial and deep compartments.    We discussed the above proposed procedure, the anticipated perioperative course and rehabilitation demands.  We discussed the risks benefits and alternatives to proceeding with surgery including bleeding, ongoing symptoms, damage to nerves muscles tendons and blood vessels, and the risk that despite the surgery she may not improve.  She expressed her understanding the above.  Questions were answered.  Arrangements can be made at her earliest possible convenience.    Patient seen and discussed with Dr. Norbert Keller MD  Orthopedic surgery resident    Patient was seen and examined with the resident.  I agree with the assessment and plan of care.        Again, thank you for allowing me to participate in the care  of your patient.        Sincerely,        Dwaine Toussaint MD

## 2024-12-04 NOTE — NURSING NOTE
Reason For Visit:   Chief Complaint   Patient presents with    RECHECK     Left forearm pain        Primary MD: No Ref-Primary, Physician  Ref. MD: Established     Age: 19 year old    ?  No      LMP 06/04/2024       Pain Assessment  Patient Currently in Pain: Yes  Patient's Stated Pain Goal: 6  0-10 Pain Scale: 6  Primary Pain Location: Arm (Left forearm)               QuickDASH Assessment      12/4/2024     9:00 AM   QuickDASH Main   1. Open a tight or new jar Moderate difficulty   2. Do heavy household chores (e.g., wash walls, floors) Mild difficulty   3. Carry a shopping bag or briefcase Moderate difficulty   4. Wash your back Mild difficulty   5. Use a knife to cut food Mild difficulty   6. Recreational activities in which you take some force or impact through your arm, shoulder or hand (e.g., golf, hammering, tennis, etc.) Mild difficulty   7. During the past week, to what extent has your arm, shoulder or hand problem interfered with your normal social activities with family, friends, neighbours or groups Slightly   8. During the past week, were you limited in your work or other regular daily activities as a result of your arm, shoulder or hand problem Slightly limited   9. Arm, shoulder or hand pain Moderate   10.Tingling (pins and needles) in your arm,shoulder or hand Mild   11. During the past week, how much difficulty have you had sleeping because of the pain in your arm, shoulder or hand No difficulty   Quickdash Ability Score 29.55          Current Outpatient Medications   Medication Sig Dispense Refill    albuterol (PROAIR HFA/PROVENTIL HFA/VENTOLIN HFA) 108 (90 Base) MCG/ACT inhaler Inhale 2 puffs into the lungs every 6 hours as needed for shortness of breath / dyspnea or wheezing (Patient not taking: Reported on 10/30/2024) 18 g 0    diclofenac (VOLTAREN) 75 MG EC tablet Take 1 tablet (75 mg) by mouth 2 times daily 42 tablet 1    diclofenac (VOLTAREN) 75 MG EC tablet Take 1 tablet (75 mg)  by mouth 2 times daily as needed for moderate pain 40 tablet 1       No Known Allergies    Edy Lal, CMA

## 2024-12-04 NOTE — PROGRESS NOTES
Maida Thompson is a 19-year-old right-hand-dominant female who returns to clinic today to rediscuss the symptoms of her left volar forearm pain.    Today she reports minimal changes in terms of symptoms as compared to her last time being evaluated in clinic.  She has been experiencing predominantly volar forearm tightness, pain and some numbness and tingling in an ulnar nerve distribution in the ring and small fingers that is brought on with exertion.  This is usually with heavier activities such as rolling on her.  If doing a very light ergo workout it may only come on after a long time but if it is more an intense workout then it comes on within minutes.  When it comes on it limits her ability to flex her fingers and she feels weak.  Does not experience any similar symptoms on the dorsum of her forearm.  She reports minimal symptoms while at rest but if doing some activities like lifting heavier chairs then it may come on slightly.  She was seen at [facility] and had an ultrasound for nerve imaging.  She is here to discuss next steps in management.  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  She is sufficiently bothered by the symptoms to consider other, more aggressive options.    We reviewed the ultrasound that Dr. Hayes performed and I discussed the findings with him as well.  This was very helpful.  The median nerve at the elbow showed no significant areas of compression.    Physical examination: Focused examination of the left forearm reveals atraumatic limb with no gross deformity.  Nontender to palpation along the flexor pronator mass however she does localize the symptoms to this area and less so over the medial elbow or cubital tunnel.  Negative Tinel's at the cubital tunnel with no ulnar nerve instability noted with elbow flexion.  No tenderness to palpation over the lateral epicondyle, and no pain to palpation over the dorsal forearm compartment.  Minimal pain over the median nerve at  the antecubital fossa.  Sensation is intact to light touch in median, radial, ulnar nerve distributions.  Good strength against resistance in FDP to the ring and small fingers and first dorsal interossei without any clear first dorsal interossei wasting.  Tinel negative at the elbow.  Minimal discomfort with resisted wrist flexion.  No discomfort with resisted wrist extension.    Assessment: 19-year-old female with exertional volar forearm compartment syndrome    Plan: Her symptoms are certainly more consistent with exertional compartment syndrome than they are any nerve entrapment.  We reviewed the above assessment with her this morning and discussed options for next steps in management.  She is sufficiently bothered by the symptoms and is interested in proceeding with surgical treatment, but of course would like to discuss with her family and .  We would plan for left forearm fasciotomy, volar superficial and deep compartments.    We discussed the above proposed procedure, the anticipated perioperative course and rehabilitation demands.  We discussed the risks benefits and alternatives to proceeding with surgery including bleeding, ongoing symptoms, damage to nerves muscles tendons and blood vessels, and the risk that despite the surgery she may not improve.  She expressed her understanding the above.  Questions were answered.  Arrangements can be made at her earliest possible convenience.    Patient seen and discussed with Dr. Norbert Keller MD  Orthopedic surgery resident    Patient was seen and examined with the resident.  I agree with the assessment and plan of care.

## 2025-01-14 DIAGNOSIS — M79.632 LEFT FOREARM PAIN: ICD-10-CM

## 2025-01-14 DIAGNOSIS — M79.A19: Primary | ICD-10-CM

## 2025-01-19 ENCOUNTER — HEALTH MAINTENANCE LETTER (OUTPATIENT)
Age: 20
End: 2025-01-19

## 2025-01-20 NOTE — PROGRESS NOTES
Preoperative Evaluation  [facility] [address]  4TH FLOOR  [facility] [address]  Phone: [phone]  Fax: [phone]  Primary Provider: Physician Sherin Ref-Primary  Pre-op Performing Provider: Jer Pérez MD  Jan 21, 2025     Fax number for surgical facility: Note does not need to be faxed, will be available electronically in Epic.      Assessment & Plan     The proposed surgical procedure is considered INTERMEDIATE risk.    Preop examination - pt is cleared to proceed with surgery without additional testing    Exertional compartment syndrome of upper extremity, left     - No identified additional risk factors other than previously addressed    Antiplatelet or Anticoagulation Medication Instructions   - Patient is on no antiplatelet or anticoagulation medications.    Additional Medication Instructions  Patient is on no additional chronic medications    Recommendation  Approval given to proceed with proposed procedure, without further diagnostic evaluation.    Jer Pérez MD  January 21, 2025  9:01 AM        Astrid Bey is a 19 year old, presenting for the following:  Pre-Op Exam      HPI related to upcoming procedure: preop for upper extremity fasciotomy    1. No - Have you ever had a heart attack or stroke?  2. No - Have you ever had surgery on your heart or blood vessels, such as a stent, coronary (heart) bypass, or surgery on an artery in the head, neck, heart, or legs?  3. No - Do you have chest pain when you are physically active?  4. No - Do you have a history of heart failure?  5. No - Do you currently have a cold, bronchitis, or symptoms of other respiratory (head and chest) infections?  6. No - Do you have a cough, shortness of breath, or wheezing?  7. No - Do you or anyone in your family have a history of blood clots?  8. No - Do you or anyone in your family have a serious bleeding problem, such as long-lasting bleeding after surgeries or  cuts?  9. No - Have you ever had anemia or been told to take iron pills?  10. No - Have you had any abnormal blood loss such as black, tarry or bloody stools, or abnormal vaginal bleeding?  11. No - Have you ever had a blood transfusion?  12. Yes - Are you willing to have a blood transfusion if it is medically needed before, during, or after your surgery?  13. No - Have you or anyone in your family ever had problems with anesthesia (sedation for surgery)?  14. No - Do you have sleep apnea, excessive snoring, or daytime drowsiness?   15. No - Do you have any artifical heart valves or other implanted medical devices, such as a pacemaker, defibrillator, or continuous glucose monitor?  16. No - Do you have any artifical joints?  17. No - Are you allergic to latex?  18. No - Is there any chance that you may be pregnant?      Health Care Directive  Patient does not have a Health Care Directive: Discussed advance care planning with patient; however, patient declined at this time.    Preoperative Review of    reviewed - no record of controlled substances prescribed.    Patient Active Problem List    Diagnosis Date Noted    Compartment syndrome of upper extremity due to exertion, unspecified laterality 01/14/2025     Priority: Medium    Left forearm pain 01/14/2025     Priority: Medium      No past medical history on file.    No past surgical history on file.    Current Outpatient Medications   Medication Sig Dispense Refill    albuterol (PROAIR HFA/PROVENTIL HFA/VENTOLIN HFA) 108 (90 Base) MCG/ACT inhaler Inhale 2 puffs into the lungs every 6 hours as needed for shortness of breath / dyspnea or wheezing (Patient not taking: Reported on 10/30/2024) 18 g 0    diclofenac (VOLTAREN) 75 MG EC tablet Take 1 tablet (75 mg) by mouth 2 times daily 42 tablet 1    diclofenac (VOLTAREN) 75 MG EC tablet Take 1 tablet (75 mg) by mouth 2 times daily as needed for moderate pain 40 tablet 1       No Known Allergies     Social History  "    Tobacco Use    Smoking status: Never    Smokeless tobacco: Never   Substance Use Topics    Alcohol use: Yes     History   Drug Use Unknown           Objective    /75 (BP Location: Left arm)   Pulse 56    Estimated body mass index is 23.4 kg/m  as calculated from the following:    Height as of 10/10/24: 1.676 m (5' 6\").    Weight as of 10/10/24: 65.8 kg (145 lb).    Physical Exam  /75 (BP Location: Left arm)   Pulse 56     GENERAL: alert and no distress  EYES: Eyes grossly normal to inspection, conjunctivae and sclerae normal  HENT: ear canals and TM's normal, nose and mouth without ulcers or lesions  NECK: no adenopathy, no asymmetry, masses, or scars  RESP: lungs clear to auscultation - no rales, rhonchi or wheezes  CV: regular rate and rhythm, normal S1 S2, no S3 or S4, no murmur, click or rub, no peripheral edema  ABDOMEN: soft, nontender, no hepatosplenomegaly, no masses and bowel sounds normal  MS: no gross musculoskeletal defects noted, no edema  SKIN: no suspicious lesions or rashes  NEURO: Normal strength and tone, mentation intact and speech normal  PSYCH: mentation appears normal, affect normal/bright    Diagnostics  No labs were ordered during this visit.   No EKG required for low risk surgery (cataract, skin procedure, breast biopsy, etc).    Revised Cardiac Risk Index (RCRI)  The patient has the following serious cardiovascular risks for perioperative complications:   - No serious cardiac risks = 0 points     RCRI Interpretation: 0 points: Class I (very low risk - 0.4% complication rate)         Signed Electronically by: Jer Pérez MD  A copy of this evaluation report is provided to the requesting physician.  "

## 2025-01-21 ENCOUNTER — OFFICE VISIT (OUTPATIENT)
Dept: ORTHOPEDICS | Facility: CLINIC | Age: 20
End: 2025-01-21
Payer: COMMERCIAL

## 2025-01-21 ENCOUNTER — PRE VISIT (OUTPATIENT)
Dept: ORTHOPEDICS | Facility: CLINIC | Age: 20
End: 2025-01-21

## 2025-01-21 ENCOUNTER — ANESTHESIA EVENT (OUTPATIENT)
Dept: SURGERY | Facility: AMBULATORY SURGERY CENTER | Age: 20
End: 2025-01-21
Payer: COMMERCIAL

## 2025-01-21 VITALS — HEART RATE: 56 BPM | DIASTOLIC BLOOD PRESSURE: 75 MMHG | SYSTOLIC BLOOD PRESSURE: 127 MMHG

## 2025-01-21 DIAGNOSIS — M79.A12: ICD-10-CM

## 2025-01-21 DIAGNOSIS — Z01.818 PREOP EXAMINATION: Primary | ICD-10-CM

## 2025-01-21 PROCEDURE — 99203 OFFICE O/P NEW LOW 30 MIN: CPT | Performed by: FAMILY MEDICINE

## 2025-01-21 NOTE — TELEPHONE ENCOUNTER
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

## 2025-01-21 NOTE — LETTER
1/21/2025      RE: Maida Thompson  [address]  [facility] [address]     Dear Colleague,    Thank you for referring your patient, Maida Thompson, to the [facility]. Please see a copy of my visit note below.    Preoperative Evaluation  [facility]  [address]  4TH FLOOR  [facility] [address]  Phone: [phone]  Fax: [phone]  Primary Provider: Physician Sherin Ref-Primary  Pre-op Performing Provider: Jer Pérez MD  Jan 21, 2025     Fax number for surgical facility: Note does not need to be faxed, will be available electronically in Epic.      Assessment & Plan    The proposed surgical procedure is considered INTERMEDIATE risk.    Preop examination - pt is cleared to proceed with surgery without additional testing    Exertional compartment syndrome of upper extremity, left     - No identified additional risk factors other than previously addressed    Antiplatelet or Anticoagulation Medication Instructions   - Patient is on no antiplatelet or anticoagulation medications.    Additional Medication Instructions  Patient is on no additional chronic medications    Recommendation  Approval given to proceed with proposed procedure, without further diagnostic evaluation.    Jer Pérez MD  January 21, 2025  9:01 AM        Subjective  Maida is a 19 year old, presenting for the following:  Pre-Op Exam      HPI related to upcoming procedure: preop for upper extremity fasciotomy    1. No - Have you ever had a heart attack or stroke?  2. No - Have you ever had surgery on your heart or blood vessels, such as a stent, coronary (heart) bypass, or surgery on an artery in the head, neck, heart, or legs?  3. No - Do you have chest pain when you are physically active?  4. No - Do you have a history of heart failure?  5. No - Do you currently have a cold, bronchitis, or symptoms of other respiratory (head and chest)  infections?  6. No - Do you have a cough, shortness of breath, or wheezing?  7. No - Do you or anyone in your family have a history of blood clots?  8. No - Do you or anyone in your family have a serious bleeding problem, such as long-lasting bleeding after surgeries or cuts?  9. No - Have you ever had anemia or been told to take iron pills?  10. No - Have you had any abnormal blood loss such as black, tarry or bloody stools, or abnormal vaginal bleeding?  11. No - Have you ever had a blood transfusion?  12. Yes - Are you willing to have a blood transfusion if it is medically needed before, during, or after your surgery?  13. No - Have you or anyone in your family ever had problems with anesthesia (sedation for surgery)?  14. No - Do you have sleep apnea, excessive snoring, or daytime drowsiness?   15. No - Do you have any artifical heart valves or other implanted medical devices, such as a pacemaker, defibrillator, or continuous glucose monitor?  16. No - Do you have any artifical joints?  17. No - Are you allergic to latex?  18. No - Is there any chance that you may be pregnant?      Health Care Directive  Patient does not have a Health Care Directive: Discussed advance care planning with patient; however, patient declined at this time.    Preoperative Review of    reviewed - no record of controlled substances prescribed.    Patient Active Problem List    Diagnosis Date Noted     Compartment syndrome of upper extremity due to exertion, unspecified laterality 01/14/2025     Priority: Medium     Left forearm pain 01/14/2025     Priority: Medium      No past medical history on file.    No past surgical history on file.    Current Outpatient Medications   Medication Sig Dispense Refill     albuterol (PROAIR HFA/PROVENTIL HFA/VENTOLIN HFA) 108 (90 Base) MCG/ACT inhaler Inhale 2 puffs into the lungs every 6 hours as needed for shortness of breath / dyspnea or wheezing (Patient not taking: Reported on 10/30/2024)  "18 g 0     diclofenac (VOLTAREN) 75 MG EC tablet Take 1 tablet (75 mg) by mouth 2 times daily 42 tablet 1     diclofenac (VOLTAREN) 75 MG EC tablet Take 1 tablet (75 mg) by mouth 2 times daily as needed for moderate pain 40 tablet 1       No Known Allergies     Social History     Tobacco Use     Smoking status: Never     Smokeless tobacco: Never   Substance Use Topics     Alcohol use: Yes     History   Drug Use Unknown           Objective   /75 (BP Location: Left arm)   Pulse 56    Estimated body mass index is 23.4 kg/m  as calculated from the following:    Height as of 10/10/24: 1.676 m (5' 6\").    Weight as of 10/10/24: 65.8 kg (145 lb).    Physical Exam  /75 (BP Location: Left arm)   Pulse 56     GENERAL: alert and no distress  EYES: Eyes grossly normal to inspection, conjunctivae and sclerae normal  HENT: ear canals and TM's normal, nose and mouth without ulcers or lesions  NECK: no adenopathy, no asymmetry, masses, or scars  RESP: lungs clear to auscultation - no rales, rhonchi or wheezes  CV: regular rate and rhythm, normal S1 S2, no S3 or S4, no murmur, click or rub, no peripheral edema  ABDOMEN: soft, nontender, no hepatosplenomegaly, no masses and bowel sounds normal  MS: no gross musculoskeletal defects noted, no edema  SKIN: no suspicious lesions or rashes  NEURO: Normal strength and tone, mentation intact and speech normal  PSYCH: mentation appears normal, affect normal/bright    Diagnostics  No labs were ordered during this visit.   No EKG required for low risk surgery (cataract, skin procedure, breast biopsy, etc).    Revised Cardiac Risk Index (RCRI)  The patient has the following serious cardiovascular risks for perioperative complications:   - No serious cardiac risks = 0 points     RCRI Interpretation: 0 points: Class I (very low risk - 0.4% complication rate)         Signed Electronically by: Jer Pérez MD  A copy of this evaluation report is provided to the requesting " physician.      Again, thank you for allowing me to participate in the care of your patient.      Sincerely,    Jer Pérez MD

## 2025-01-22 ENCOUNTER — ANESTHESIA (OUTPATIENT)
Dept: SURGERY | Facility: AMBULATORY SURGERY CENTER | Age: 20
End: 2025-01-22
Payer: COMMERCIAL

## 2025-01-22 ENCOUNTER — HOSPITAL ENCOUNTER (OUTPATIENT)
Facility: AMBULATORY SURGERY CENTER | Age: 20
Discharge: HOME | End: 2025-01-22
Attending: ORTHOPAEDIC SURGERY
Payer: COMMERCIAL

## 2025-01-22 VITALS
HEART RATE: 53 BPM | RESPIRATION RATE: 19 BRPM | TEMPERATURE: 97 F | BODY MASS INDEX: 23.3 KG/M2 | WEIGHT: 145 LBS | DIASTOLIC BLOOD PRESSURE: 92 MMHG | OXYGEN SATURATION: 100 % | HEIGHT: 66 IN | SYSTOLIC BLOOD PRESSURE: 147 MMHG

## 2025-01-22 DIAGNOSIS — M79.A19: Primary | ICD-10-CM

## 2025-01-22 LAB
HCG UR QL: NEGATIVE
INTERNAL QC OK POCT: NORMAL
POCT KIT EXPIRATION DATE: NORMAL
POCT KIT LOT NUMBER: NORMAL

## 2025-01-22 RX ORDER — NALOXONE HYDROCHLORIDE 0.4 MG/ML
0.4 INJECTION, SOLUTION INTRAMUSCULAR; INTRAVENOUS; SUBCUTANEOUS
Status: DISCONTINUED | OUTPATIENT
Start: 2025-01-22 | End: 2025-01-23 | Stop reason: HOSPADM

## 2025-01-22 RX ORDER — NALOXONE HYDROCHLORIDE 0.4 MG/ML
0.2 INJECTION, SOLUTION INTRAMUSCULAR; INTRAVENOUS; SUBCUTANEOUS
Status: DISCONTINUED | OUTPATIENT
Start: 2025-01-22 | End: 2025-01-23 | Stop reason: HOSPADM

## 2025-01-22 RX ORDER — BUPIVACAINE HYDROCHLORIDE 5 MG/ML
INJECTION, SOLUTION EPIDURAL; INTRACAUDAL
Status: COMPLETED | OUTPATIENT
Start: 2025-01-22 | End: 2025-01-22

## 2025-01-22 RX ORDER — OXYCODONE HYDROCHLORIDE 5 MG/1
5 TABLET ORAL EVERY 6 HOURS PRN
Qty: 5 TABLET | Refills: 0 | Status: SHIPPED | OUTPATIENT
Start: 2025-01-22 | End: 2025-01-25

## 2025-01-22 RX ORDER — PROPOFOL 10 MG/ML
INJECTION, EMULSION INTRAVENOUS PRN
Status: DISCONTINUED | OUTPATIENT
Start: 2025-01-22 | End: 2025-01-22

## 2025-01-22 RX ORDER — OXYCODONE HYDROCHLORIDE 5 MG/1
10 TABLET ORAL
Status: DISCONTINUED | OUTPATIENT
Start: 2025-01-22 | End: 2025-01-23 | Stop reason: HOSPADM

## 2025-01-22 RX ORDER — CEFAZOLIN SODIUM 2 G/50ML
2 SOLUTION INTRAVENOUS
Status: DISCONTINUED | OUTPATIENT
Start: 2025-01-22 | End: 2025-01-23 | Stop reason: HOSPADM

## 2025-01-22 RX ORDER — FENTANYL CITRATE 50 UG/ML
25 INJECTION, SOLUTION INTRAMUSCULAR; INTRAVENOUS
Status: DISCONTINUED | OUTPATIENT
Start: 2025-01-22 | End: 2025-01-23 | Stop reason: HOSPADM

## 2025-01-22 RX ORDER — ONDANSETRON 2 MG/ML
INJECTION INTRAMUSCULAR; INTRAVENOUS PRN
Status: DISCONTINUED | OUTPATIENT
Start: 2025-01-22 | End: 2025-01-22

## 2025-01-22 RX ORDER — DEXAMETHASONE SODIUM PHOSPHATE 10 MG/ML
4 INJECTION, SOLUTION INTRAMUSCULAR; INTRAVENOUS
Status: DISCONTINUED | OUTPATIENT
Start: 2025-01-22 | End: 2025-01-23 | Stop reason: HOSPADM

## 2025-01-22 RX ORDER — NALOXONE HYDROCHLORIDE 0.4 MG/ML
0.1 INJECTION, SOLUTION INTRAMUSCULAR; INTRAVENOUS; SUBCUTANEOUS
Status: DISCONTINUED | OUTPATIENT
Start: 2025-01-22 | End: 2025-01-23 | Stop reason: HOSPADM

## 2025-01-22 RX ORDER — FLUMAZENIL 0.1 MG/ML
0.2 INJECTION, SOLUTION INTRAVENOUS
Status: DISCONTINUED | OUTPATIENT
Start: 2025-01-22 | End: 2025-01-23 | Stop reason: HOSPADM

## 2025-01-22 RX ORDER — PROPOFOL 10 MG/ML
INJECTION, EMULSION INTRAVENOUS CONTINUOUS PRN
Status: DISCONTINUED | OUTPATIENT
Start: 2025-01-22 | End: 2025-01-22

## 2025-01-22 RX ORDER — FENTANYL CITRATE 50 UG/ML
25-50 INJECTION, SOLUTION INTRAMUSCULAR; INTRAVENOUS
Status: DISCONTINUED | OUTPATIENT
Start: 2025-01-22 | End: 2025-01-23 | Stop reason: HOSPADM

## 2025-01-22 RX ORDER — GABAPENTIN 300 MG/1
300 CAPSULE ORAL ONCE
Status: COMPLETED | OUTPATIENT
Start: 2025-01-22 | End: 2025-01-22

## 2025-01-22 RX ORDER — OXYCODONE HYDROCHLORIDE 5 MG/1
5 TABLET ORAL
Status: DISCONTINUED | OUTPATIENT
Start: 2025-01-22 | End: 2025-01-23 | Stop reason: HOSPADM

## 2025-01-22 RX ORDER — ACETAMINOPHEN 325 MG/1
975 TABLET ORAL ONCE
Status: COMPLETED | OUTPATIENT
Start: 2025-01-22 | End: 2025-01-22

## 2025-01-22 RX ORDER — ONDANSETRON 2 MG/ML
4 INJECTION INTRAMUSCULAR; INTRAVENOUS EVERY 30 MIN PRN
Status: DISCONTINUED | OUTPATIENT
Start: 2025-01-22 | End: 2025-01-23 | Stop reason: HOSPADM

## 2025-01-22 RX ORDER — ONDANSETRON 4 MG/1
4 TABLET, ORALLY DISINTEGRATING ORAL EVERY 30 MIN PRN
Status: DISCONTINUED | OUTPATIENT
Start: 2025-01-22 | End: 2025-01-23 | Stop reason: HOSPADM

## 2025-01-22 RX ORDER — SODIUM CHLORIDE, SODIUM LACTATE, POTASSIUM CHLORIDE, CALCIUM CHLORIDE 600; 310; 30; 20 MG/100ML; MG/100ML; MG/100ML; MG/100ML
INJECTION, SOLUTION INTRAVENOUS CONTINUOUS PRN
Status: DISCONTINUED | OUTPATIENT
Start: 2025-01-22 | End: 2025-01-22

## 2025-01-22 RX ORDER — CEFAZOLIN SODIUM 2 G/50ML
2 SOLUTION INTRAVENOUS SEE ADMIN INSTRUCTIONS
Status: DISCONTINUED | OUTPATIENT
Start: 2025-01-22 | End: 2025-01-23 | Stop reason: HOSPADM

## 2025-01-22 RX ADMIN — SODIUM CHLORIDE, SODIUM LACTATE, POTASSIUM CHLORIDE, CALCIUM CHLORIDE: 600; 310; 30; 20 INJECTION, SOLUTION INTRAVENOUS at 12:26

## 2025-01-22 RX ADMIN — GABAPENTIN 300 MG: 300 CAPSULE ORAL at 12:00

## 2025-01-22 RX ADMIN — ACETAMINOPHEN 975 MG: 325 TABLET ORAL at 12:00

## 2025-01-22 RX ADMIN — PROPOFOL 125 MCG/KG/MIN: 10 INJECTION, EMULSION INTRAVENOUS at 13:01

## 2025-01-22 RX ADMIN — PROPOFOL 40 MG: 10 INJECTION, EMULSION INTRAVENOUS at 13:00

## 2025-01-22 RX ADMIN — BUPIVACAINE HYDROCHLORIDE 20 ML: 5 INJECTION, SOLUTION EPIDURAL; INTRACAUDAL at 12:30

## 2025-01-22 RX ADMIN — CEFAZOLIN SODIUM 2 G: 2 SOLUTION INTRAVENOUS at 12:55

## 2025-01-22 RX ADMIN — ONDANSETRON 4 MG: 2 INJECTION INTRAMUSCULAR; INTRAVENOUS at 13:00

## 2025-01-22 RX ADMIN — FENTANYL CITRATE 25 MCG: 50 INJECTION, SOLUTION INTRAMUSCULAR; INTRAVENOUS at 12:24

## 2025-01-22 NOTE — ANESTHESIA PREPROCEDURE EVALUATION
"Anesthesia Pre-Procedure Evaluation    Patient: Maida Thompson   MRN: [id number] : 2005        Procedure : Procedure(s):  LEFT FOREARM FASCIOTOMY, DEEP AND SUPERFICIAL COMPARTMENTS          No past medical history on file.   History reviewed. No pertinent surgical history.   No Known Allergies   Social History     Tobacco Use    Smoking status: Never    Smokeless tobacco: Never   Substance Use Topics    Alcohol use: Yes      Wt Readings from Last 1 Encounters:   25 65.8 kg (145 lb)           Physical Exam    Airway        Mallampati: I       Respiratory Devices and Support         Dental       (+) Minor Abnormalities - some fillings, tiny chips      Cardiovascular          Rhythm and rate: regular     Pulmonary                   OUTSIDE LABS:  CBC: No results found for: \"WBC\", \"HGB\", \"HCT\", \"PLT\"  BMP: No results found for: \"NA\", \"POTASSIUM\", \"CHLORIDE\", \"CO2\", \"BUN\", \"CR\", \"GLC\"  COAGS: No results found for: \"PTT\", \"INR\", \"FIBR\"  POC:   Lab Results   Component Value Date    HCG Negative 2025     HEPATIC: No results found for: \"ALBUMIN\", \"PROTTOTAL\", \"ALT\", \"AST\", \"GGT\", \"ALKPHOS\", \"BILITOTAL\", \"BILIDIRECT\", \"DALTON\"  OTHER: No results found for: \"PH\", \"LACT\", \"A1C\", \"PIPO\", \"PHOS\", \"MAG\", \"LIPASE\", \"AMYLASE\", \"TSH\", \"T4\", \"T3\", \"CRP\", \"SED\"    Anesthesia Plan    ASA Status:  1    NPO Status:  NPO Appropriate    Anesthesia Type: MAC.     - Reason for MAC: straight local not clinically adequate              Consents    Anesthesia Plan(s) and associated risks, benefits, and realistic alternatives discussed. Questions answered and patient/representative(s) expressed understanding.     - Discussed:     - Discussed with:  Patient            Postoperative Care            Comments:               Yonas Delgado MD    I have reviewed the pertinent notes and labs in the chart from the past 30 days and (re)examined the patient.  Any updates or changes from those notes are reflected in this " note.    Clinically Significant Risk Factors Present on Admission

## 2025-01-22 NOTE — ANESTHESIA PROCEDURE NOTES
"Brachial plexus Procedure Note    Pre-Procedure   Staff -        Anesthesiologist:  Yonas Delgado MD       Performed By: anesthesiologist       Location: pre-op       Pre-Anesthestic Checklist: patient identified, IV checked, site marked, risks and benefits discussed, informed consent, monitors and equipment checked, pre-op evaluation, at physician/surgeon's request and post-op pain management  Timeout:       Correct Patient: Yes        Correct Procedure: Yes        Correct Site: Yes        Correct Position: Yes        Correct Laterality: Yes        Site Marked: Yes  Procedure Documentation  Procedure: Brachial plexus         Diagnosis: POST OPERATIVE PAIN       Laterality: left       Patient Position: sitting       Patient Prep/Sterile Barriers: sterile gloves, mask       Skin prep: Chloraprep (supraclavicular approach).       Needle Type: short bevel       Needle Gauge: 21.        Needle Length (millimeters): 110        Ultrasound guided       1. Ultrasound was used to identify targeted nerve, plexus, vascular marker, or fascial plane and place a needle adjacent to it in real-time.       2. Ultrasound was used to visualize the spread of anesthetic in close proximity to the above referenced structure.       3. A permanent image is entered into the patient's record.    Assessment/Narrative         The placement was negative for: blood aspirated, painful injection and site bleeding       Paresthesias: No.       Bolus given via needle..        Secured via.        Insertion/Infusion Method: Single Shot       Complications: none       Injection made incrementally with aspirations every 5 mL.    Medication(s) Administered   Bupivacaine 0.5% PF (Infiltration) - Infiltration   20 mL - 1/22/2025 12:30:00 PM  20 mL BUPivacaine liposome 1.3 %    FOR [facility] ONLY:   Pain Team Contact information: please page the Pain Team Via [email or web address]. Search \"Pain\". During daytime hours, please page the attending first. " At night please page the resident first.

## 2025-01-22 NOTE — ANESTHESIA POSTPROCEDURE EVALUATION
Patient: Maida Thompson    Procedure: Procedure(s):  LEFT FOREARM FASCIOTOMY, DEEP AND SUPERFICIAL COMPARTMENTS       Anesthesia Type:  MAC    Note:  Disposition: Outpatient   Postop Pain Control: Uneventful            Sign Out: Well controlled pain   PONV: No   Neuro/Psych: Uneventful            Sign Out: Acceptable/Baseline neuro status   Airway/Respiratory: Uneventful            Sign Out: Acceptable/Baseline resp. status   CV/Hemodynamics: Uneventful            Sign Out: Acceptable CV status   Other NRE: NONE   DID A NON-ROUTINE EVENT OCCUR? No           Last vitals:  Vitals Value Taken Time   /92 01/22/25 1418   Temp 36.1  C (97  F) 01/22/25 1418   Pulse 59 01/22/25 1417   Resp 19 01/22/25 1418   SpO2 100 % 01/22/25 1419   Vitals shown include unfiled device data.    Electronically Signed By: Philip Linares MD  January 22, 2025  2:36 PM

## 2025-01-22 NOTE — OR NURSING
Patient received left side Brachial Plexus nerve block  with Exparel.  Fentanyl 25mcg and Versed 2mg given. Tolerated procedure well.

## 2025-01-22 NOTE — ANESTHESIA CARE TRANSFER NOTE
Patient: Maida Thompson    Procedure: Procedure(s):  LEFT FOREARM FASCIOTOMY, DEEP AND SUPERFICIAL COMPARTMENTS       Diagnosis: Compartment syndrome of upper extremity due to exertion, unspecified laterality [M79.A19]  Left forearm pain [M79.632]  Diagnosis Additional Information: No value filed.    Anesthesia Type:   MAC     Note:    Oropharynx: spontaneously breathing  Level of Consciousness: awake  Oxygen Supplementation: room air    Independent Airway: airway patency satisfactory and stable  Dentition: dentition unchanged  Vital Signs Stable: post-procedure vital signs reviewed and stable  Report to RN Given: handoff report given  Patient transferred to: Phase II    Handoff Report: Identifed the Patient, Identified the Reponsible Provider, Reviewed the pertinent medical history, Discussed the surgical course, Reviewed Intra-OP anesthesia mangement and issues during anesthesia, Set expectations for post-procedure period and Allowed opportunity for questions and acknowledgement of understanding  Vitals:  Vitals Value Taken Time   BP     Temp     Pulse     Resp     SpO2         Electronically Signed By: NORMA Saenz CRNA  January 22, 2025  1:45 PM  
[place]

## 2025-01-22 NOTE — OP NOTE
ORTHOPEDIC OPERATIVE REPORT    Date of Service: 1/22/2025    Attending Surgeon: Dr. Dwaine Toussaint MD    Assistant Surgeon(s): Davion Frost MD PGY 4    Preoperative Diagnosis: Left forearm exertional compartment syndrome, volar, superficial and deep    Postoperative Diagnosis: Same    Procedure(s) Performed: Fasciotomy left forearm volar superficial and deep compartments    Indication(s) for Surgery: Maida is a 19 year-old female with the above listed diagnoses had been refractory to conservative management.. Treatments options including nonoperative and operative were discussed with the patient and family. The benefits, risks, and alternatives to surgery were explained. Risks include bleeding, infection, neurovascular injury, anesthesia risks, blood clots, myocardial infarction, stroke, loss of limb, and death. The patient and family demonstrated understanding and elected to proceed. An informed consent was obtained.    Anesthesia: MAC plus block    Antibiotics: Ancef    Estimated Blood Loss: 5 cc    Complications: None    Operative Findings: Superficial and deep volar compartments released    Disposition: Home per PACU protocol    Description of Procedures: The patient was met in the preoperative holding area where their identity, procedure, and surgical site were confirmed. The surgical site was marked using indelible ink. The H&P and consent form were reviewed. All outstanding questions were answered. The patient was brought back to the surgical suite. Anesthesia was induced and preoperative antibiotics were administered. The patient was positioned supine.  A safety strap was used and all bony prominences were well padded. A nonsterile tourniquet was placed on the operative extremity. The operative extremity was prepped and draped and sterile fashion. A surgical timeout was conducted by all members of the operating room team per hospital protocol. All in the room were in agreement. There were no safety  concerns.    Attention was turned to the volar forearm.  Aligned with a line connecting the medial epicondyle with the FCR tendon, approximately 6 cm incision was made using a 15 blade scalpel and the proximal mid forearm.  Tenotomy scissors were then used to dissect down to the level of the fascia overlying the interval between the FDS and FCU.  Care was taken to protect cutaneous sensory nerve branches.  The fascia of the superficial compartment overlying the FDS was incised using a 15 blade scalpel.  This was then extended both proximally and distally using a Metzenbaum scissors.  The release, the muscle belly splayed out through the fascial release and was felt to be decompressed.    Next, attention was turned to the interval between the FDS and FCU muscle bellies.  FCU was scoped with from the intermuscular septum and this was followed down to the deep compartment.  Was confirmed that this was a deep compartment by flexing the DIP joints of the fingers to identify FDP.  The ulnar nerve was readily visible within the wound and protected.  Again a 15 blade scalpel was used to incise the fascia overlying the deep compartment and this was again carried proximally and distally using a Metzenbaum scissors.  The ulnar nerve remained protected throughout the procedure.  After release of the fascia, the muscle belly FDS was felt to be adequate released and not under any compression by the fascia.    All wounds were irrigated with normal saline.  Interrupted subcuticular Monocryl was used to close the skin.  Steri-Strips were applied as well as sterile gauze, Mykel wrap, and an Ace wrap.  The patient was aroused from anesthesia, transferred safely to the [facility], and brought in stable condition to the PACU.    All sponge and needle counts were correct at the end of the case. The patient tolerated the procedure well. There were no apparent complications. Dr. Toussaint was present during all critical portions of the  case and supervised all fellow, resident, and physician assistant.    Same-day surgery  Weight bearing and activity restrictions: Coffee cup weightbearing until skin heals  Keep dressing intact until follow-up  Immobilization: Sling until block wears off  Disposition: Home per PACU protocol  Follow-up: 1 to 2 weeks for wound check    --  Davion Frost MD  Orthopedic Surgery PGY-4

## 2025-01-22 NOTE — DISCHARGE INSTRUCTIONS
Date: 2025    DIAGNOSIS  1. Compartment syndrome of upper extremity due to exertion, unspecified laterality        MEDICATIONS   Resume all home medications as directed unless otherwise instructed during this hospitalization. If there is any question, double check with your primary care provider.  Start new discharge medications as directed.    Take 1-2 tablets of extra strength Tylenol 500 mg every 6 hours. You may also take 400-600 mg of ibuprofen every 6 hours.    For breakthrough pain use narcotic pain medication as prescribed.    Do not drive or operate machinery while taking narcotic pain medications.   If you are taking other Tylenol containing medicines at home, be sure NOT to exceed 4 gram's (4000 milligrams) of Tylenol per day.   Do NOT exceed 2400 mg of ibuprofen per day.  If you are taking pain medications, be sure to take Colace (docusate sodium) as well to prevent constipation. If constipated, try adding another cathartic or enema.  If nausea and vomiting, call the hospital or seek medical attention.    ACTIVITY   Weight bearin lb coffee cup weight bearing to operative extremity until wound heals    DIET  Resume same diet prior to your hospital admission.    WOUND   Leave dressing on until you are seen in clinic for your follow up visit.   Watch for signs and symptoms of infection of your wounds including; pain, redness, swelling, drainage or fever.  If you notice any of these symptoms please call or seek medical attention.    Keep wound clean, dry, and intact.  Do not submerge wounds in water until they are healed. No baths, soaking, swimming, or prolonged water exposure for 4 weeks after surgery.    RETURN   Follow-up with Orthopedic Clinic as directed.     Future Appointments   Date Time Provider Department Center   2025  3:20 PM Farzana Ferrara PA-C [facility]       Call the [facility] at [phone] during business hours for any symptoms such as:  "   * Fevers with Temperature greater than 101.5 degrees.   * Pus drainage from wound site.   * Severe pain, not controlled by medication.   * Persistent nausea, vomiting and inablility to tolerate fluids.      FOR URGENT PROBLEMS ONLY, after hours or on weekends call the hospital  at [phone] and ask to speak with the orthopedic resident on call.    [facility]  Home Care Following Anesthesia  For 24 hours after surgery:  Get plenty of rest.  A responsible adult must stay with you for at least 24 hours after you leave the surgery center.  Do not drive or use heavy equipment.  If you have weakness or tingling, don't drive or use heavy equipment until this feeling goes away.   Do not drink alcohol.   Avoid strenuous or risky activities.  Ask for help when climbing stairs.  You may feel lightheaded.  IF so, sit for a few minutes before standing.  Have someone help you get up.   If you have nausea (feel sick to your stomach): Drink only clear liquids such as apple juice, ginger ale, broth or 7-Up.  Rest may also help.  Be sure to drink enough fluids.  Move to a regular diet as you feel able.   You may have a slight fever.  Call the doctor if your fever is over 100 F (37.7 C) (taken under the tongue) or lasts longer than 24 hours.  You may have a dry mouth, a sore throat, muscle aches or trouble sleeping. These should go away after 24 hours.  Do not make important or legal decisions.   It is recommended to avoid smoking.        Today you received an Exparel block to numb the nerves near your surgery site.  This is a block using local anesthetic or \"numbing\" medication injected around the nerves to anesthetize or \"numb\" the area supplied by those nerves.  This block is injected into the muscle layer near your surgical site.  This medication may numb the location where you had surgery up to 72 hours.  If your surgical site is an arm or leg you should be careful with your " affected limb, since it is possible to injure your limb without being aware of it due to the numbing.  Until full feeling returns, you should guard against bumping or hitting your limb, and avoid extreme hot or cold temperatures on the skin.  As the block wears off, the feeling will return as a tingling or prickly sensation near your surgical site.  You will experince more discomfort from your incision as the feeling returns.  You may want to take a pain pill (a narcotic or Tylenol if this was prescribed by your surgeon) when you start to experience mild pain before the pain beomes more severe.  If your pain medications do not control your pain, you should notify your surgeon.    Tips for taking pain medications  To get the best pain relief possible, remember these points:  Take pain medications as directed, before pain becomes severe.  Pain medication can upset your stomach: taking it with food may help.  Constipation is a common side effect of pain medication. Drink plenty of  fluids.  Eat foods high in fiber. Take a stool softener if recommended by your doctor or pharmacist.  Do not drink alcohol, drive or operate machinery while taking pain medications.  Ask about other ways to control pain, such as with heat, ice or relaxation.    Tylenol/Acetaminophen Consumption    If you feel your pain relief is insufficient, you may take Tylenol/Acetaminophen in addition to your narcotic pain medication.   Be careful not to exceed 4,000 mg of Tylenol/Acetaminophen in a 24 hour period from all sources.  If you are taking extra strength Tylenol/acetaminophen (500 mg), the maximum dose is 8 tablets in 24 hours.  If you are taking regular strength acetaminophen (325 mg), the maximum dose is 12 tablets in 24 hours.  Tylenol 975 mg given at 12 pm.   Ok to take more after 6 pm.       Call a doctor for any of the following:  Signs of infection (fever, growing tenderness at the surgery site, a large amount of drainage or bleeding,  severe pain, foul-smelling drainage, redness, swelling).  It has been over 8 to 10 hours since surgery and you are still not able to urinate (pass water).  Headache for over 24 hours.  Numbness, tingling or weakness the day after surgery (if you had spinal anesthesia).  Signs of Covid-19 infection (temperature over 100 degrees, shortness of breath, cough, loss of taste/smell, generalized body aches, persistent headache, chills, sore throat, nausea/vomiting/diarrhea)    Your doctor is:  Dr. Dwaine Toussaint, Orthopaedics: [phone]                    After hours and weekends call the hospital @ [phone] and ask for the resident on call for:  Orthopaedics  For emergency care, call the:  [facility] [phone] (TTY for hearing impaired: [phone])

## 2025-02-19 ENCOUNTER — OFFICE VISIT (OUTPATIENT)
Dept: ORTHOPEDICS | Facility: CLINIC | Age: 20
End: 2025-02-19
Payer: COMMERCIAL

## 2025-02-19 DIAGNOSIS — M79.A19: Primary | ICD-10-CM

## 2025-02-19 PROCEDURE — 99024 POSTOP FOLLOW-UP VISIT: CPT | Mod: GC | Performed by: ORTHOPAEDIC SURGERY

## 2025-02-19 NOTE — NURSING NOTE
Reason For Visit:   Chief Complaint   Patient presents with    Surgical Followup     Post op Left Forearm Fasciotomy, Deep And Superficial Compartments - Left. DOS: 1/22/25       Primary MD: No Ref-Primary, Physician  Ref. MD: Sarah    Age: 19 year old    ?  No      LMP 01/01/2025 (Exact Date)       Pain Assessment  Patient Currently in Pain: No    Hand Dominance Evaluation  Hand Dominance: Right          QuickDASH Assessment      12/4/2024     9:00 AM   QuickDASH Main   1. Open a tight or new jar Moderate difficulty   2. Do heavy household chores (e.g., wash walls, floors) Mild difficulty   3. Carry a shopping bag or briefcase Moderate difficulty   4. Wash your back Mild difficulty   5. Use a knife to cut food Mild difficulty   6. Recreational activities in which you take some force or impact through your arm, shoulder or hand (e.g., golf, hammering, tennis, etc.) Mild difficulty   7. During the past week, to what extent has your arm, shoulder or hand problem interfered with your normal social activities with family, friends, neighbours or groups Slightly   8. During the past week, were you limited in your work or other regular daily activities as a result of your arm, shoulder or hand problem Slightly limited   9. Arm, shoulder or hand pain Moderate   10.Tingling (pins and needles) in your arm,shoulder or hand Mild   11. During the past week, how much difficulty have you had sleeping because of the pain in your arm, shoulder or hand No difficulty   Quickdash Ability Score 29.55          Current Outpatient Medications   Medication Sig Dispense Refill    albuterol (PROAIR HFA/PROVENTIL HFA/VENTOLIN HFA) 108 (90 Base) MCG/ACT inhaler Inhale 2 puffs into the lungs every 6 hours as needed for shortness of breath / dyspnea or wheezing 18 g 0    diclofenac (VOLTAREN) 75 MG EC tablet Take 1 tablet (75 mg) by mouth 2 times daily 42 tablet 1    diclofenac (VOLTAREN) 75 MG EC tablet Take 1 tablet (75 mg) by mouth 2  times daily as needed for moderate pain 40 tablet 1       No Known Allergies    Mya Gallegos, ATC

## 2025-02-19 NOTE — PROGRESS NOTES
ORTHOPEDIC HAND SURGERY FOLLOW-UP    LAST ENCOUNTER: Patient was last seen 1/31/2025 by Farzana Ferrara PA-C for postoperative follow-up after undergoing left forearm fasciotomy for exertional compartment syndrome on 1/22/2025.  At that time she is doing well and the incision was healing.    HISTORY OF PRESENT ILLNESS:  Maida Thompson is a 19 year old right-hand-dominant female who presents to clinic approximately 4 weeks status post left forearm fasciotomy for surgical compartment syndrome on 1/22/2025 with Dr. Toussaint.    Today she reports that she is doing well, she has no pain around the incision.  She has been able to return to rowing using low direct resistance.  She denies any recurrence of pain or tightness with activity.  She denies any numbness or tingling into the fingers.  She has no concerns today and is wondering if she can advance her activities.    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 Toussaint, progressing appropriately without recurrence of symptoms at 4 weeks postop.    PLAN:  - Okay to soak in tub/shower  - Okay to return to activity in a stepwise fashion with gradual buildup  - Weightbearing as tolerated  - Follow-up PRN    The patient was seen and discussed with Dr. Toussaint.    Voice-to-text dictation software was utilized in the creation of this note therefore there may be unintended word substitutions, although errors are generally corrected real-time, there is the potential for a rare error to be present in the completed chart. Please do not hesitate to reach out for clarification.    Dwaine Milligan,  MD  Orthopaedic Surgery Resident  02/19/25

## 2025-02-19 NOTE — LETTER
2/19/2025      Maida Thompson  [address]  [place] [address]      Dear Colleague,    Thank you for referring your patient, Maida Thompson, to the [facility] [place]. Please see a copy of my visit note below.    ORTHOPEDIC HAND SURGERY FOLLOW-UP    LAST ENCOUNTER: Patient was last seen 1/31/2025 by Farzana Ferrara PA-C for postoperative follow-up after undergoing left forearm fasciotomy for exertional compartment syndrome on 1/22/2025.  At that time she is doing well and the incision was healing.    HISTORY OF PRESENT ILLNESS:  Maida Thompson is a 19 year old right-hand-dominant female who presents to clinic approximately 4 weeks status post left forearm fasciotomy for surgical compartment syndrome on 1/22/2025 with Dr. Toussaint.    Today she reports that she is doing well, she has no pain around the incision.  She has been able to return to rowing using low direct resistance.  She denies any recurrence of pain or tightness with activity.  She denies any numbness or tingling into the fingers.  She has no concerns today and is wondering if she can advance her activities.    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 Toussaint, progressing appropriately without recurrence of symptoms at 4 weeks postop.    PLAN:  - Okay to soak in tub/shower  - Okay to return to activity in a stepwise fashion with gradual buildup  - Weightbearing as tolerated  - Follow-up PRN    The patient was seen and discussed with Dr. Toussaint.    Voice-to-text dictation software was utilized in the creation of this note  therefore there may be unintended word substitutions, although errors are generally corrected real-time, there is the potential for a rare error to be present in the completed chart. Please do not hesitate to reach out for clarification.    Dwaine Milligan MD  Orthopaedic Surgery Resident  02/19/25    Attestation signed by Dwaine Toussaint MD at 2/19/2025  9:12 AM:  Patient was seen and examined with the resident.  I agree with the assessment and plan of care.        Again, thank you for allowing me to participate in the care of your patient.        Sincerely,        Dwaine Toussaint MD    Electronically signed

## 2025-03-05 ENCOUNTER — OFFICE VISIT (OUTPATIENT)
Dept: ORTHOPEDICS | Facility: CLINIC | Age: 20
End: 2025-03-05
Payer: COMMERCIAL

## 2025-03-05 VITALS
SYSTOLIC BLOOD PRESSURE: 104 MMHG | BODY MASS INDEX: 24.91 KG/M2 | WEIGHT: 155 LBS | DIASTOLIC BLOOD PRESSURE: 65 MMHG | HEIGHT: 66 IN | HEART RATE: 59 BPM

## 2025-03-05 DIAGNOSIS — R25.2 CRAMP OF LIMB: Primary | ICD-10-CM

## 2025-03-05 NOTE — PROGRESS NOTES
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  Maida has noticed over the past 2 to 3 weeks that she has been cramping in all 4 of her extremities.  This was relatively acute onset and has been happening every day, multiple times.  She points to her left quadricep and right calf, although she also feels in her arms.  She had a recent surgery for a forearm decompression due to chronic exertional forearm compartment syndrome.  She has been hydrating well, she denies any urinary symptoms.        Additional history: as documented    MEDICAL HISTORY  Patient Active Problem List   Diagnosis    Compartment syndrome of upper extremity due to exertion, unspecified laterality    Left forearm pain       Current Outpatient Medications   Medication Sig Dispense Refill    albuterol (PROAIR HFA/PROVENTIL HFA/VENTOLIN HFA) 108 (90 Base) MCG/ACT inhaler Inhale 2 puffs into the lungs every 6 hours as needed for shortness of breath / dyspnea or wheezing 18 g 0    diclofenac (VOLTAREN) 75 MG EC tablet Take 1 tablet (75 mg) by mouth 2 times daily 42 tablet 1    diclofenac (VOLTAREN) 75 MG EC tablet Take 1 tablet (75 mg) by mouth 2 times daily as needed for moderate pain 40 tablet 1       No Known Allergies    No family history on file.    Additional medical/Social/Surgical histories reviewed in [facility] and updated as appropriate.        PHYSICAL EXAM  Vitals:    03/05/25 0904   BP: 104/65   Pulse: 59   Weight: 70.3 kg (155 lb)   Height: 1.676 m (5' 6\")     Physical Exam  Vitals reviewed. Exam conducted with a chaperone present.   Cardiovascular:      Rate and Rhythm: Normal rate and regular rhythm.      Pulses: Normal pulses.      Heart sounds: Normal heart sounds. No murmur heard.  Pulmonary:      Effort: Pulmonary effort is normal.   Musculoskeletal:         General: No tenderness. Normal range of motion.      Comments: Cramping in right lateral " proximal calf elicited with passive right ankle flexion   Skin:     General: Skin is warm and dry.                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    Maida and I did discuss that oftentimes the etiology of cramping can be difficult to discern.  However, given the acute onset and frequency at which this is occurring I am ordering blood work.  Will get in touch with her with results.    It was a pleasure seeing Maida today.    Justin Jo DO, [facility]  Primary Care Sports Medicine      This note was constructed using Dragon dictation software, please excuse any minor errors in spelling, grammar, or syntax.

## 2025-03-07 ENCOUNTER — LAB (OUTPATIENT)
Dept: LAB | Facility: CLINIC | Age: 20
End: 2025-03-07
Attending: FAMILY MEDICINE
Payer: COMMERCIAL

## 2025-03-07 DIAGNOSIS — R25.2 CRAMP OF LIMB: ICD-10-CM

## 2025-03-07 LAB
ALBUMIN SERPL BCG-MCNC: 4.5 G/DL (ref 3.5–5.2)
ALBUMIN UR-MCNC: NEGATIVE MG/DL
ALP SERPL-CCNC: 60 U/L (ref 40–150)
ALT SERPL W P-5'-P-CCNC: 36 U/L (ref 0–50)
ANION GAP SERPL CALCULATED.3IONS-SCNC: 11 MMOL/L (ref 7–15)
APPEARANCE UR: CLEAR
AST SERPL W P-5'-P-CCNC: 37 U/L (ref 0–45)
BACTERIA #/AREA URNS HPF: ABNORMAL /HPF
BASOPHILS # BLD AUTO: 0 10E3/UL (ref 0–0.2)
BASOPHILS NFR BLD AUTO: 0 %
BILIRUB SERPL-MCNC: 0.3 MG/DL
BILIRUB UR QL STRIP: NEGATIVE
BUN SERPL-MCNC: 17.8 MG/DL (ref 6–20)
CALCIUM SERPL-MCNC: 9.6 MG/DL (ref 8.8–10.4)
CHLORIDE SERPL-SCNC: 103 MMOL/L (ref 98–107)
COLOR UR AUTO: ABNORMAL
CREAT SERPL-MCNC: 0.8 MG/DL (ref 0.51–0.95)
EGFRCR SERPLBLD CKD-EPI 2021: >90 ML/MIN/1.73M2
EOSINOPHIL # BLD AUTO: 0.1 10E3/UL (ref 0–0.7)
EOSINOPHIL NFR BLD AUTO: 1 %
ERYTHROCYTE [DISTWIDTH] IN BLOOD BY AUTOMATED COUNT: 12.2 % (ref 10–15)
GLUCOSE SERPL-MCNC: 90 MG/DL (ref 70–99)
GLUCOSE UR STRIP-MCNC: NEGATIVE MG/DL
HCO3 SERPL-SCNC: 24 MMOL/L (ref 22–29)
HCT VFR BLD AUTO: 37.9 % (ref 35–47)
HGB BLD-MCNC: 12.7 G/DL (ref 11.7–15.7)
HGB UR QL STRIP: NEGATIVE
IMM GRANULOCYTES # BLD: 0 10E3/UL
IMM GRANULOCYTES NFR BLD: 0 %
KETONES UR STRIP-MCNC: NEGATIVE MG/DL
LEUKOCYTE ESTERASE UR QL STRIP: NEGATIVE
LYMPHOCYTES # BLD AUTO: 2.6 10E3/UL (ref 0.8–5.3)
LYMPHOCYTES NFR BLD AUTO: 40 %
MAGNESIUM SERPL-MCNC: 2.1 MG/DL (ref 1.7–2.3)
MCH RBC QN AUTO: 30 PG (ref 26.5–33)
MCHC RBC AUTO-ENTMCNC: 33.5 G/DL (ref 31.5–36.5)
MCV RBC AUTO: 89 FL (ref 78–100)
MONOCYTES # BLD AUTO: 0.5 10E3/UL (ref 0–1.3)
MONOCYTES NFR BLD AUTO: 7 %
NEUTROPHILS # BLD AUTO: 3.5 10E3/UL (ref 1.6–8.3)
NEUTROPHILS NFR BLD AUTO: 52 %
NITRATE UR QL: NEGATIVE
NRBC # BLD AUTO: 0 10E3/UL
NRBC BLD AUTO-RTO: 0 /100
PH UR STRIP: 6 [PH] (ref 5–7)
PLATELET # BLD AUTO: 436 10E3/UL (ref 150–450)
POTASSIUM SERPL-SCNC: 4 MMOL/L (ref 3.4–5.3)
PROT SERPL-MCNC: 7.4 G/DL (ref 6.4–8.3)
RBC # BLD AUTO: 4.24 10E6/UL (ref 3.8–5.2)
RBC URINE: 0 /HPF
SODIUM SERPL-SCNC: 138 MMOL/L (ref 135–145)
SP GR UR STRIP: 1 (ref 1–1.03)
SQUAMOUS EPITHELIAL: <1 /HPF
TSH SERPL DL<=0.005 MIU/L-ACNC: 1.64 UIU/ML (ref 0.3–4.2)
UROBILINOGEN UR STRIP-MCNC: NORMAL MG/DL
WBC # BLD AUTO: 6.7 10E3/UL (ref 4–11)
WBC URINE: 0 /HPF

## 2025-03-07 PROCEDURE — 84443 ASSAY THYROID STIM HORMONE: CPT

## 2025-03-07 PROCEDURE — 36415 COLL VENOUS BLD VENIPUNCTURE: CPT

## 2025-03-07 PROCEDURE — 85014 HEMATOCRIT: CPT

## 2025-03-07 PROCEDURE — 81003 URINALYSIS AUTO W/O SCOPE: CPT

## 2025-03-07 PROCEDURE — 85004 AUTOMATED DIFF WBC COUNT: CPT

## 2025-03-07 PROCEDURE — 83735 ASSAY OF MAGNESIUM: CPT

## 2025-03-07 PROCEDURE — 80053 COMPREHEN METABOLIC PANEL: CPT

## 2025-05-01 ENCOUNTER — OFFICE VISIT (OUTPATIENT)
Dept: FAMILY MEDICINE | Facility: CLINIC | Age: 20
End: 2025-05-01
Payer: COMMERCIAL

## 2025-05-01 VITALS
HEIGHT: 66 IN | DIASTOLIC BLOOD PRESSURE: 71 MMHG | WEIGHT: 155 LBS | SYSTOLIC BLOOD PRESSURE: 105 MMHG | BODY MASS INDEX: 24.91 KG/M2 | HEART RATE: 69 BPM

## 2025-05-01 DIAGNOSIS — M54.50 ACUTE BILATERAL LOW BACK PAIN WITHOUT SCIATICA: Primary | ICD-10-CM

## 2025-05-01 DIAGNOSIS — M67.911 TENDINOPATHY OF RIGHT ROTATOR CUFF: ICD-10-CM

## 2025-05-01 RX ORDER — DICLOFENAC SODIUM 75 MG/1
75 TABLET, DELAYED RELEASE ORAL 2 TIMES DAILY
Qty: 42 TABLET | Refills: 1 | Status: SHIPPED | OUTPATIENT
Start: 2025-05-01

## 2025-05-01 ASSESSMENT — ENCOUNTER SYMPTOMS
NECK PAIN: 0
NEUROLOGICAL NEGATIVE: 1
RESPIRATORY NEGATIVE: 1
CONSTITUTIONAL NEGATIVE: 1
CARDIOVASCULAR NEGATIVE: 1
PSYCHIATRIC NEGATIVE: 1
GASTROINTESTINAL NEGATIVE: 1
BACK PAIN: 1
MYALGIAS: 1
FALLS: 0

## 2025-05-01 NOTE — PROGRESS NOTES
HPI  In for return of some low back pain and spasm.  R greater than left.  Lateral of midline.  No radicular sxs or red flags.  No weakness.  Has had a lot of activity and bus rides.  Had similar episode last year.  Improved with rehab and nsaid.    No other concerns.    Review of Systems   Constitutional: Negative.    HENT: Negative.     Respiratory: Negative.     Cardiovascular: Negative.    Gastrointestinal: Negative.    Musculoskeletal:  Positive for back pain and myalgias. Negative for falls, joint pain and neck pain.   Neurological: Negative.    Psychiatric/Behavioral: Negative.           Physical Exam  Vitals reviewed.   Constitutional:       Appearance: Normal appearance.   Musculoskeletal:         General: Tenderness present. No swelling or deformity. Normal range of motion.   Skin:     General: Skin is warm and dry.   Neurological:      General: No focal deficit present.      Mental Status: She is alert and oriented to person, place, and time.   Psychiatric:         Mood and Affect: Mood normal.         Behavior: Behavior normal.         Thought Content: Thought content normal.         Judgment: Judgment normal.            Low Back Pain and SI dysfunction  Will get xrays for baseline eval  Diclofenac prn   Cont rehab  Follow-up if not improving

## 2025-05-07 ENCOUNTER — TELEPHONE (OUTPATIENT)
Dept: ORTHOPEDICS | Facility: CLINIC | Age: 20
End: 2025-05-07
Payer: COMMERCIAL

## 2025-05-08 ENCOUNTER — MYC MEDICAL ADVICE (OUTPATIENT)
Dept: ORTHOPEDICS | Facility: CLINIC | Age: 20
End: 2025-05-08
Payer: COMMERCIAL

## 2025-05-08 ENCOUNTER — TELEPHONE (OUTPATIENT)
Dept: ORTHOPEDICS | Facility: CLINIC | Age: 20
End: 2025-05-08
Payer: COMMERCIAL

## 2025-05-08 NOTE — TELEPHONE ENCOUNTER
Tried to call pt about STAFF message below from  but could not leave VM at either ph# in chart. NO VM set up.    Katerine Tello, Helena Whitten, SPARKLE  Cc: Justin Voss MD Hi Holly,    I am reaching out again about getting another forearm fasciotomy for Maida Thompson. She had the left arm done in January 2025. Hoping to get her on the schedule for 5/19 or 5/20 pending she will be able to travel back to [place] for the summer on 5/26/2025.    Please let me know if you need any additional information!    Katerine Tello ATC.    Helena Galindo RN is out all week on vacation.  I tried to call both ph# in chart for pt & could NOT leave VM. I did not try to call parents ph#s in [place] since pt is an adult.    I assume patient would need a return appt with  to make a decision about another surgery? Are you talking about the SAME LEFT side or Right side?  I am CCing  above in staff message for his input about timing for this month & if he wants RTN appt.  I am gone THurs & Fri so if  replies that he wants an appt then you can call  Orthopedic clinic scheduling [phone]. Im also CCing both our clinic Trainers for Hand clinic above Kyler & Mya who can help.   Shavon Gamboa RN.

## 2025-05-08 NOTE — TELEPHONE ENCOUNTER
Left Voicemail (1st Attempt) and Sent Mychart (1st Attempt) for the patient to call back and schedule the following:    Appointment type: Return Hand/Wrist  Provider: Dr. Toussaint  Return date: 5/14/25 at 9:50am  Specialty phone number: [phone]

## 2025-05-08 NOTE — TELEPHONE ENCOUNTER
----- Message from Mya HIGGINS sent at 5/8/2025  8:10 AM CDT -----  Can someone please reach out to this patient and offer her an appointment with Dr. Toussaint next Wednesday? The 9:50 spot is on hold for her. He needs to see her soon to discuss potential surgery on her right arm.  ----- Message -----  From: Dwaine Toussaint MD  Sent: 5/8/2025   8:04 AM CDT  To: Mya Gallegos ATC; Justin Voss MD; Alyx#    Good morning all,I am happy to get her on the schedule in May, we could potentially schedule the case but I would like to see her in clinic before then if possible to reassess her left arm and evaluate her right arm preoperatively.Let me know what I can do to help facilitate.  Feel free to text me, [phone].Thanks,Alden  ----- Message -----  From: Shavon Gamboa RN  Sent: 5/7/2025   7:40 PM CDT  To: Mya Gallegos ATC; Justin Voss MD; Alyx#    Helena Galindo RN is out all week on vacation.  I tried to call both ph# in chart for pt & could NOT leave VM. I did not try to call parents ph#s in [place] since pt is an adult.    I assume patient would need a return appt with  to make a decision about another surgery? Are you talking about the SAME LEFT side or Right side?  I am CCing  above for his input about timing for this month & if he wants RTN appt.  I am gone THurs & Fri so if  replies that he wants an appt then you can call  Orthopedic clinic scheduling [phone]. Im also CCing both our clinic Trainers for Hand clinic above who can help.   Shavon Gamboa RN.  ----- Message -----  From: Katerine Tello ATC  Sent: 5/6/2025   3:54 PM CDT  To: Justin Voss MD; SPARKLE Horn,     I am reaching out again about getting another forearm fasciotomy for Maida Thompson. She had the left arm done in January 2025. Hoping to get her on the schedule for 5/19 or 5/20 pending she will be able to travel back to [place] for the summer on 5/26/2025.    Please let me  know if you need any additional information!    Katerine Tello, ATC

## 2025-05-12 NOTE — TELEPHONE ENCOUNTER
Left Voicemail (2nd Attempt) and Sent Mychart (2nd Attempt) for the patient to call back and schedule the following:     Appointment type: Return Hand/Wrist  Provider: Dr. Toussaint  Return date: 5/14/25 at 9:50am  Specialty phone number: [phone]

## 2025-05-21 ENCOUNTER — ANCILLARY PROCEDURE (OUTPATIENT)
Dept: MRI IMAGING | Facility: CLINIC | Age: 20
End: 2025-05-21
Attending: FAMILY MEDICINE
Payer: COMMERCIAL

## 2025-05-21 ENCOUNTER — ANCILLARY PROCEDURE (OUTPATIENT)
Dept: GENERAL RADIOLOGY | Facility: CLINIC | Age: 20
End: 2025-05-21
Attending: FAMILY MEDICINE
Payer: COMMERCIAL

## 2025-05-21 DIAGNOSIS — M54.50 ACUTE BILATERAL LOW BACK PAIN WITHOUT SCIATICA: ICD-10-CM

## 2025-05-21 DIAGNOSIS — M25.562 ACUTE PAIN OF LEFT KNEE: ICD-10-CM

## 2025-05-21 DIAGNOSIS — M25.562 ACUTE PAIN OF LEFT KNEE: Primary | ICD-10-CM

## 2025-05-21 PROCEDURE — 72110 X-RAY EXAM L-2 SPINE 4/>VWS: CPT | Performed by: STUDENT IN AN ORGANIZED HEALTH CARE EDUCATION/TRAINING PROGRAM

## 2025-05-21 PROCEDURE — 72170 X-RAY EXAM OF PELVIS: CPT | Performed by: RADIOLOGY

## 2025-05-21 PROCEDURE — 73721 MRI JNT OF LWR EXTRE W/O DYE: CPT | Mod: LT | Performed by: RADIOLOGY

## 2025-05-22 ENCOUNTER — OFFICE VISIT (OUTPATIENT)
Dept: FAMILY MEDICINE | Facility: CLINIC | Age: 20
End: 2025-05-22
Payer: COMMERCIAL

## 2025-05-22 VITALS
BODY MASS INDEX: 25.07 KG/M2 | SYSTOLIC BLOOD PRESSURE: 119 MMHG | HEART RATE: 81 BPM | DIASTOLIC BLOOD PRESSURE: 70 MMHG | HEIGHT: 66 IN | WEIGHT: 156 LBS

## 2025-05-22 DIAGNOSIS — M25.562 ACUTE PAIN OF LEFT KNEE: Primary | ICD-10-CM

## 2025-05-22 RX ORDER — MELOXICAM 15 MG/1
15 TABLET ORAL DAILY
Qty: 30 TABLET | Refills: 0 | Status: SHIPPED | OUTPATIENT
Start: 2025-05-22

## 2025-05-22 ASSESSMENT — ENCOUNTER SYMPTOMS
BACK PAIN: 1
MYALGIAS: 1
FALLS: 0
RESPIRATORY NEGATIVE: 1
NECK PAIN: 0
CONSTITUTIONAL NEGATIVE: 1
NEUROLOGICAL NEGATIVE: 1
PSYCHIATRIC NEGATIVE: 1
CARDIOVASCULAR NEGATIVE: 1

## 2025-05-22 NOTE — PROGRESS NOTES
HPI  In for imaging review of her L knee and Low back.  Has had some chronic changes and ongoing issues.  Doing ok overall.  Has been doing rehab.  Was concerned about her Knee as it felt like she had a patella subluxation event a couple of weeks ago.  Not much swelling no bruising but some ongoing pain.  Reviewed imaging and went over it with her today.  Pain is medial and anterior over the fat pad.  No other mechanical symptoms or laxity.  Back pain is chronic.  No radicular sxs or concerns.    Review of Systems   Constitutional: Negative.    HENT: Negative.     Respiratory: Negative.     Cardiovascular: Negative.    Musculoskeletal:  Positive for back pain, joint pain and myalgias. Negative for falls and neck pain.   Neurological: Negative.    Psychiatric/Behavioral: Negative.           Physical Exam  Vitals reviewed.   Constitutional:       Appearance: Normal appearance.   Musculoskeletal:         General: Tenderness present. No swelling. Normal range of motion.      Comments: NO swelling or effusion. Pain to palp over the ant medial aspect of her knee on her fat pad.  No bony TTP.  No pain over MPFL.  Lig intact and stable.  Neg MM.      Back with FROM, normal strength.  Neg SLR.  NV intact   Neurological:      Mental Status: She is alert.   Psychiatric:         Mood and Affect: Mood normal.         Behavior: Behavior normal.         Thought Content: Thought content normal.         Judgment: Judgment normal.           MR left knee without contrast 5/21/2025 1:11 PM     Techniques: Multiplanar multisequence imaging of the left knee was  obtained without administration of intra-articular or intravenous  contrast using routine protocol.     History: Acute pain of left knee     Comparison: None     Findings:     MENISCI:  Medial meniscus: Intact.  Lateral meniscus: Intact.     LIGAMENTS  Cruciate ligaments: Intact.  Medial supporting structures: Intact.  Lateral supporting structures: Intact.     EXTENSOR  MECHANISM  Intact.     FLUID  Small joint effusion. Presumed small ganglion cyst in the  suprapatellar recess. No substantial Baker's cyst.     OSSEOUS and ARTICULAR STRUCTURES  Bones: Relatively linear T2 hyperintensity in the posterior femur are  diaphysis, red marrow. No fracture, contusion, or osseous lesion is  seen.     Patellofemoral compartment: No hyaline cartilage disease.Borderline  patella alia(SI index 1.4). Mildly dysplastic trochlea. Laterally  subluxed patella. Trochlear groove tibial tuberosity distance is 22  mm, increased. Increased T2 signal in the superolateral Hoffa's fat  pad.     Medial compartment: No hyaline cartilage disease.     Lateral compartment: No hyaline cartilage disease.     ANCILLARY FINDINGS  Small fluid in the deep infrapatellar bursa.                                                                      Impression:  1. Findings of patellofemoral maltracking:  Borderline patella alia.  Mildly dysplastic trochlea.  Laterally subluxed patella.  Trochlear groove tibial tuberosity distance is 22 mm, increased.  Increased T2 signal in the superolateral Hoffa's fat pad.     I have personally reviewed the examination and initial interpretation  and I agree with the findings.     JUTTA ELLERMANN, MD     4 views lumbar spine radiographs 5/21/2025 4:12 PM     History: Acute bilateral low back pain without sciatica      Comparison: None     Findings:     Standing  AP, lateral including flexion extension views of the lumbar  spine were obtained.     5  lumbar type vertebral bodies are assumed for the purpose of this  dictation.     There is no acute osseous abnormality.       No substantial degenerative changes of the lumbar spine. Mild disc  space loss at L5-S1. Otherwise, disc spaces are relatively preserved.  L5-S1 facet arthropathy. Normal AP alignment is maintained. No  substantial motion of the spine on flexion or extension views.      The visualized bowel gas pattern is  non-obstructive.                                                                      Impression:  1.  No acute osseous abnormality.  2.  Mild degenerative changes at L5-S1.  3.  Normal alignment without evidence of dynamic instability.     ARDEN CRUMP MD     1 views pelvis radiograph(s) 5/21/2025 1:31 PM     History: Acute bilateral low back pain without sciatica     Comparison: None available.     Findings:     AP view(s) of the pelvis were obtained.      No acute osseous abnormality.     No substantial degenerative change of hips.     Sacrum and innominate bones are partially obscured by overlying bowel  gas/fecal content.     Pelvic phlebolith.                                                                      Impression:  1. No acute osseous abnormality.  2. No substantial degenerative change.     MAURICIO BARBARA       A/P     L knee pain  Some laxity with subluxation a few weeks ago.  MRI with no sig ligament injury or bony contusion  Low back pain  On and off issues.  No radicular sxs.  Was on anti iflammatories.  SOme degen changes in L5/S1.  No other red flags  -will cont rehab  -offered anti inflammatory-will start mobic  -will follow on return to school in fall

## 2025-09-04 ENCOUNTER — OFFICE VISIT (OUTPATIENT)
Dept: FAMILY MEDICINE | Facility: CLINIC | Age: 20
End: 2025-09-04
Payer: COMMERCIAL

## 2025-09-04 VITALS
BODY MASS INDEX: 24.43 KG/M2 | SYSTOLIC BLOOD PRESSURE: 108 MMHG | HEART RATE: 82 BPM | HEIGHT: 66 IN | DIASTOLIC BLOOD PRESSURE: 69 MMHG | WEIGHT: 152 LBS

## 2025-09-04 DIAGNOSIS — T73.3XXA FATIGUE DUE TO EXCESSIVE EXERTION, INITIAL ENCOUNTER: Primary | ICD-10-CM

## 2025-09-04 DIAGNOSIS — M25.531 PAIN IN JOINT, FOREARM, RIGHT: ICD-10-CM

## 2025-09-04 ASSESSMENT — ENCOUNTER SYMPTOMS
EYES NEGATIVE: 1
WEIGHT LOSS: 0
RESPIRATORY NEGATIVE: 1
FEVER: 0
CHILLS: 0
PSYCHIATRIC NEGATIVE: 1
NEUROLOGICAL NEGATIVE: 1
DIAPHORESIS: 0
CARDIOVASCULAR NEGATIVE: 1

## (undated) DEVICE — Device

## (undated) DEVICE — DRAPE STOCKINETTE 4" 8544

## (undated) DEVICE — GOWN XLG DISP 9545

## (undated) DEVICE — SU MONOCRYL 5-0 P-3 18" UND Y493G

## (undated) DEVICE — LINEN ORTHO PACK 5446

## (undated) DEVICE — SOL WATER IRRIG 500ML BOTTLE 2F7113

## (undated) DEVICE — SLING ARM MED 79-99155

## (undated) DEVICE — DRAPE STERI TOWEL LG 1010

## (undated) DEVICE — SUCTION MANIFOLD NEPTUNE 2 SYS 4 PORT 0702-020-000

## (undated) DEVICE — PREP CHLORAPREP 26ML TINTED HI-LITE ORANGE 930815

## (undated) DEVICE — GLOVE BIOGEL PI SZ 8.0 40880

## (undated) DEVICE — PACK HAND CUSTOM ASC

## (undated) DEVICE — SOL NACL 0.9% IRRIG 500ML BOTTLE 2F7123

## (undated) DEVICE — DRSG STERI STRIP 1/2X4" R1547

## (undated) DEVICE — ADH LIQUID MASTISOL TOPICAL VIAL 2-3ML 0523-48

## (undated) DEVICE — SU ETHILON 5-0 P-3 18" BLACK 698G

## (undated) RX ORDER — ACETAMINOPHEN 325 MG/1
TABLET ORAL
Status: DISPENSED
Start: 2025-01-22

## (undated) RX ORDER — GABAPENTIN 300 MG/1
CAPSULE ORAL
Status: DISPENSED
Start: 2025-01-22

## (undated) RX ORDER — FENTANYL CITRATE 50 UG/ML
INJECTION, SOLUTION INTRAMUSCULAR; INTRAVENOUS
Status: DISPENSED
Start: 2025-01-22

## (undated) RX ORDER — CEFAZOLIN SODIUM 2 G/50ML
SOLUTION INTRAVENOUS
Status: DISPENSED
Start: 2025-01-22